# Patient Record
Sex: FEMALE | URBAN - METROPOLITAN AREA
[De-identification: names, ages, dates, MRNs, and addresses within clinical notes are randomized per-mention and may not be internally consistent; named-entity substitution may affect disease eponyms.]

---
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
## 2020-06-12 ENCOUNTER — OFFICE VISIT (OUTPATIENT)
Dept: NEUROLOGY | Facility: CLINIC | Age: 20
End: 2020-06-12
Payer: COMMERCIAL

## 2020-06-12 VITALS
OXYGEN SATURATION: 98 % | SYSTOLIC BLOOD PRESSURE: 104 MMHG | HEIGHT: 65 IN | TEMPERATURE: 97.4 F | WEIGHT: 141 LBS | HEART RATE: 77 BPM | DIASTOLIC BLOOD PRESSURE: 74 MMHG | BODY MASS INDEX: 23.49 KG/M2

## 2020-06-12 DIAGNOSIS — G43.711 INTRACTABLE CHRONIC MIGRAINE WITHOUT AURA AND WITH STATUS MIGRAINOSUS: Primary | ICD-10-CM

## 2020-06-12 PROCEDURE — 99205 OFFICE O/P NEW HI 60 MIN: CPT | Performed by: PSYCHIATRY & NEUROLOGY

## 2020-06-12 RX ORDER — RIZATRIPTAN BENZOATE 10 MG/1
TABLET, ORALLY DISINTEGRATING ORAL
Qty: 9 TABLET | Refills: 3 | Status: SHIPPED | OUTPATIENT
Start: 2020-06-12 | End: 2020-10-23

## 2020-06-12 RX ORDER — PREDNISONE 10 MG/1
TABLET ORAL
Qty: 30 TABLET | Refills: 0 | Status: SHIPPED | OUTPATIENT
Start: 2020-06-12 | End: 2020-10-23

## 2020-06-12 RX ORDER — FLUTICASONE PROPIONATE 50 MCG
SPRAY, SUSPENSION (ML) NASAL
COMMUNITY
Start: 2020-05-21 | End: 2022-07-20

## 2020-06-12 SDOH — HEALTH STABILITY: MENTAL HEALTH: HOW OFTEN DO YOU HAVE A DRINK CONTAINING ALCOHOL?: MONTHLY OR LESS

## 2020-06-12 NOTE — LETTER
"2020     Ileana Mills, DO  [address]  [address]  [place] [address]    Patient: Sirena Shin  YOB: 2000  Date of Visit: 2020      Dear Dr. Mills:    Thank you for referring Sirena Shin to me for evaluation. Below are my notes for this consultation.    If you have questions, please do not hesitate to call me. I look forward to following your patient along with you.         Sincerely,        Deborah Nogueira MD        CC: No Recipients  Deborah Nogueira MD  2020  4:49 PM  Signed  Patient ID: Sirena Shin                              : 2000  MRN: [id number]                                            VISIT DATE: 2020   ENCOUNTER PROVIDER: Deborah Nogueira  REFERRING PROVIDER: No ref. provider found    I had the pleasure of evaluating Sirena Shin in the [facility] (\[facility]) on 2020 for a new patient visit. The history was provided by Sirena Shin and her mother and supplemented by her medical records.    CHIEF COMPLAINT: Headache.    HISTORY OF PRESENT ILLNESS:  Sirena FREDERICK is here for chronic headaches that started in May 2019. The headaches started without known precipitating event (i.e. illness, new medications, head/neck injury, or significant stressor) but she was months later tested for Lyme disease and she recall having found a tick on her scalp in May 2019. Sirena was not a \"headachy\" kid or adolescent, but she reports having had 2 typical migraine headaches in high school that were more intense than her current headaches. Mother, Father, paternal grandmother, and sister have a history of severe headaches and some have been diagnosed with migraine headaches.    Her current headaches started gradually sometime in May 2019. She denies a sudden onset or daily constant pattern from the beginning. She remembers having frequent, but intermittent headaches over the summer, some of them " severe enough to require rest. They gradually increased in frequency and by the Fall she was having daily and constant headache. She initially attributed her headaches to sinusitis because of the location of the pain, but she denies other symptoms of sinusitis other than the facial/head pain. She has only been headache free a few day sin the last year, mostly during courses of antibiotics, but the headache always reoccurred within a few days, sometimes even before completing the antibiotic course. She has not tried steroids. She has only taken over-the-counters which have not helped at all.      She has had an extensive evaluation including brain MRI (3/2020), sinus CT, and extensive blood work that has been unremarkable except for a positive Lyme IgM antibody with negative IgG (interpreted as false positive by Dr. Uriarte). She was diagnosed with mycoplasma infection in February and her fatigue resolved after a course of antibiotics, but the headache continued to worsen. CBC, CMP, ESR, SUBHASH, TSH/T4 and testing for Celiac disease and thyroiditis have been negative. She has seen ENT, ID, and Eye doctors and all her evaluations have been negative.      Details about the headache:  Frequency: Daily, constant 4/10 with exacerbations. Only a few headache free days after antibiotics and after chiropractor treatment for 1 day   Location: around eyes and nose, right side much worse than left. Throbbing in the right hemicranium. Pain radiates to the right side of the neck  Quality: pressure in her face and throbbing in the right side of the head  Severity: 4-8/10, sometimes incapacitating, but not frequently (maybe about 10 times in a year incapacitated for a couple of days)  Duration: constant, always present to some degree.  Timing: worse around dinner time, present upon awakening  Aggravation by regular physical activity: yes  Associated features: denies photophobia and phonophobia (but seeks rest in dark quiet room to  sleep), denies nausea and vomiting. Some times experience vertigo when headache is more severe. Brain fog and difficulty focusing associated with the headaches.  Presence of aura: denies visual changes.   Focal neurologic symptoms: no weakness, numbness, coordination or balance problems.  No unilateral cranial autonomic symptoms (lacrimation, conjunctival injection, nasal congestion or rhinorrhea, ptosis, eyelid edema, forehead/facial sweating, miosis) restlessness or agitation.   Positional headache: no   Precipitated by Valsalva maneuvers: no  Neck pain: yes, right sided with headache  Relieving factors: rest in dark quiet room  Exacerbating factors: as below  Related to menstrual cycle: worse around menstruation  Other triggers: alcohol (even small amount and randomly), lack of sleep, fatigue, exercise, possibly stress.  Disability: Unable to perform usual activities (work/school/family/social) completely or partially when headache is severe.    HIT-6 score = 66  PHQ-9 score = 3    Lifestyle:  Sleep: good sleeper. Gets 8 hours per night. Sirean Shin denies snoring, but may grind her teeth.     Diet: does not skip meals.   Exercise: yes    PAST TREATMENTS/MEDICATIONS:  Preventive:  None other than antibiotics  Acute or as needed:  Advil no benefit  Tylenol no benefit  Excedrin migraine - no benefit  IV medications/Hospitalizations:  None  Non-Pharmacologic:  Chiropractor treatment helped.    MEDICATIONS AT START OF VISIT:    Current Outpatient Medications:   •  fluticasone propionate (FLONASE) 50 mcg/actuation nasal spray, INSTILL 2 SPRAYS TO EACH NOSTRIL ONCE DAILY, Disp: , Rfl:     ALLERGIES: has No Known Allergies.     REVIEW OF SYSTEMS: As discussed above. Otherwise, all other ROS were reviewed and negative.    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   Per mom she has unusual medical problems.    PAST SURGICAL HISTORY:  has a past surgical history  that includes Anterior cruciate ligament repair (Right, 2016) and [place] tooth extraction (Bilateral, 2015).    FAMILY HISTORY: family history includes Anti-cardiolipin syndrome in her maternal grandfather; Autoimmune disease in her biological mother; Heart disease in her paternal grandfather.   Mother had thyroiditis and mesangial proliferative glomerulonephritis in remission for 8 years (but no Lupus).  Strong family history of headaches and migraine.    SOCIAL HISTORY:  reports that she has never smoked. She has never used smokeless tobacco. She reports that she drinks alcohol. She reports that she does not use drugs.    Sirena FREDERICK has a female partner. I have discussed with her the possible teratogenic effects of some medications and she verbalized understanding.     PHYSICAL EXAMINATION:    Vitals:    06/12/20 0949   BP: 104/74   Pulse: 77   Temp: 36.3 °C (97.4 °F)   SpO2: 98%      General: Well developed, well nourished, in no acute distress.  Examination of carotid arteries: No bruits  Cardiac auscultation: Regular rate & rhythm, no murmurs.  Craniofacial examination: Normal, no evidence of temporomandibular joint disease, no trigger points. Normal conjunctiva.   Neck: Normal range of motion. No trigger points.     NEUROLOGICAL EXAM:  Alert and oriented. Normal memory, attention span and concentration. Normal language and speech. Normal fund of knowledge.   Cranial nerves:   Ophthalmoscopic examination normal with sharp disc margins bilaterally.   II-VI: Pupils were equal, round, and reactive to light and accommodation. Extraocular movements were intact without nystagmus.  V: Intact sensation to light touch in the distribution of the three trigeminal branches.   VII: Face was symmetric with normal strength.   VIII: Air conduction intact bilaterally.  IX, X: Uvula midline, palate contracts and elevates symmetrically, normal voice.  XI: Normal shoulder elevation and head turning.  XII: Tongue protrudes midline,  normal bulk and without fasciculations.   Muscle strength in upper and lower extremities: 5/5 throughout symmetrically. Pronator drift was negative.  Muscle tone in upper and lower extremities was normal.   There were no abnormal movements.  Deep tendon reflexes in upper and lower extremities: 2+ symmetrically throughout.   Coordination: intact bilaterally to finger-nose testing.  Sensation: intact to light touch in four extremities.  Gait: narrow based, could do tandem walk, and Romberg was negative.       Data Reviewed:   I will personally review the brain MRI once I am able to upload it in our system (patient brought CD today).  She has had an extensive evaluation including brain MRI (3/2020), sinus CT, and extensive blood work that has been unremarkable except for a positive Lyme IgM antibody with negative IgG (interpreted as false positive by Dr. Uriarte). She was diagnosed with mycoplasma infection in February and her fatigue resolved after a course of antibiotics, but the headache continued to worsen. CBC, CMP, ESR, SUBHASH, TSH/T4 and testing for Celiac disease and thyroiditis have been negative. She has seen ENT, ID, and Eye doctors and all her evaluations have been negative.     IMPRESSION/PLAN:  Sirena Shin is a very pleasant 19 y.o. young woman seen for chronic moderate and severe headaches since May 2019. Her neurological exam is normal. She had a normal brain MRI (3/2020). She has a strong family history of migraine. Extensive diagnostic evaluation has been unremarkable. In my opinion, she has chronic migraine. Below are my recommendations.     Diagnosis:  Chronic migraine without aura     Evaluation:  No additional tests are needed at this time. If there are new symptoms or changes in the characteristics of the headaches, I will re-evaluate Sirena FREDERICK and consider if diagnostic tests are needed.     Treatment plan:      1. Healthy Habits: The following recommendations can greatly reduce the number and  severity of headaches.  · Maintain regular sleep hours and get sufficient sleep (8-9 hours)  · Do not skip meals, especially breakfast  · Drink at least 64 oz or 8 cups of water daily - enough to urinate 5-6 times a day  · Get at least 30 minutes of daily aerobic exercise (enough to increase your heart rate and sweat)  · Keep track of headaches and used of acute medication (prescription or over-the-counter) in a calendar or notebook and bring that to your clinic visits.    2. Acute Treatment (limited to 2 days per week, in general):     Start rizatriptan 10 mg 1 tab as needed at onset of moderate to severe headache. May repeat 1 tablet 2 hours later. Maximum 2 tablets in 24 hr. Limit to 2 days/week. Recommend to take only half a tablet if it is the first time you try this medication. After that, if there are no side effects, a full tablet can be used.    Other triptans, ubrogepant, rimegepant, or migranal NS can be tried if needed in the future.   We may add an NSAID and/or an antinausea medication as co-adjuvant if monotherapy is not sufficient.      3. Preventive Treatment (when headaches occur more than 1 day/week or interfere with functioning):     Start prednisone 10 m tablets once a day x 2 days, then 4 tablets once a day x 2 days, then 3 tablets once a day x 2 days, then 2 tablets once a day x 2 days, then 1 tablet once a day x 2 days, then stop. Take in the morning with food or milk. Do not take NSAIDs while on prednisone.    Options for oral preventive medications include: nortriptyline, betablockers, topiramate, zonisamide, valproate, verapamil, magnesium, riboflavin, melatonin, gabapentin, pregabaline, duloxetine, candesartan, namenda, escitalopram, venlafaxine, and levetiracetam. I would favor nortriptyline as first line and nutraceuticals.     Procedures that can be used to prevent headaches include: botox (if headaches occur 15 or more days/month), nerve blocks and trigger point injections. We  recommend to obtain prior authorization from insurance when considering these. Most insurances will require trying 2-3 of the oral medications before they will cover botox.    Anti CGRP monoclonal antibodies (Erenumab or Aimovig, Fremanezumab or Ajovy, Galcanezumab or Emgality, and Eptinezumab or Vyepti) are a new group of treatment available for prevention of migraine. These are only approved for adult use in migraine and would be considered off-label for other headache disorders or in individuals under 18. The long term effects of these treatments remain unknown. Most insurances will require trying 2-3 of the oral medications before they will cover these the anti-CGRP antibodies.     Several non-invasive neuromodulation devices are now available to treat headaches preventively and/or acutely, but they are typically not covered by insurance. At this time, we can consider GammaCore, Cefaly, sTMS Mini, and Nerivio.     Follow-up in the Headache Clinic in 6 weeks and we will communicate through the portal in the interim.     We appreciate the opportunity to participate in the care of Sirena FREDERICK.     Please, do not hesitate to call me at [phone] if you have any questions or concerns.          Deborah Wright MD  [facility]  [facility]     I have spent more than 60 min face-to-face with the patient and more than 50% of the time was dedicated to counseling and coordination of care as described in the above note and printed after visit patient instructions.

## 2020-06-12 NOTE — PROGRESS NOTES
"Patient ID: Sirena Shin                              : 2000  MRN: [id number]                                            VISIT DATE: 2020   ENCOUNTER PROVIDER: Deborah Nogueira  REFERRING PROVIDER: No ref. provider found    I had the pleasure of evaluating Sirena Shin in the Headache Program of Neurology ([facility]) on 2020 for a new patient visit. The history was provided by Sirena Shin and her mother and supplemented by her medical records.    CHIEF COMPLAINT: Headache.    HISTORY OF PRESENT ILLNESS:  Sirena FREDERICK is here for chronic headaches that started in May 2019. The headaches started without known precipitating event (i.e. illness, new medications, head/neck injury, or significant stressor) but she was months later tested for Lyme disease and she recall having found a tick on her scalp in May 2019. Sirena was not a \"headachy\" kid or adolescent, but she reports having had 2 typical migraine headaches in high school that were more intense than her current headaches. Mother, Father, paternal grandmother, and sister have a history of severe headaches and some have been diagnosed with migraine headaches.    Her current headaches started gradually sometime in May 2019. She denies a sudden onset or daily constant pattern from the beginning. She remembers having frequent, but intermittent headaches over the summer, some of them severe enough to require rest. They gradually increased in frequency and by the Fall she was having daily and constant headache. She initially attributed her headaches to sinusitis because of the location of the pain, but she denies other symptoms of sinusitis other than the facial/head pain. She has only been headache free a few day sin the last year, mostly during courses of antibiotics, but the headache always reoccurred within a few days, sometimes even before completing the antibiotic course. She has not tried steroids. She has only taken " over-the-counters which have not helped at all.      She has had an extensive evaluation including brain MRI (3/2020), sinus CT, and extensive blood work that has been unremarkable except for a positive Lyme IgM antibody with negative IgG (interpreted as false positive by Dr. Uriarte). She was diagnosed with mycoplasma infection in February and her fatigue resolved after a course of antibiotics, but the headache continued to worsen. CBC, CMP, ESR, SUBHASH, TSH/T4 and testing for Celiac disease and thyroiditis have been negative. She has seen ENT, ID, and Eye doctors and all her evaluations have been negative.      Details about the headache:  Frequency: Daily, constant 4/10 with exacerbations. Only a few headache free days after antibiotics and after chiropractor treatment for 1 day   Location: around eyes and nose, right side much worse than left. Throbbing in the right hemicranium. Pain radiates to the right side of the neck  Quality: pressure in her face and throbbing in the right side of the head  Severity: 4-8/10, sometimes incapacitating, but not frequently (maybe about 10 times in a year incapacitated for a couple of days)  Duration: constant, always present to some degree.  Timing: worse around dinner time, present upon awakening  Aggravation by regular physical activity: yes  Associated features: denies photophobia and phonophobia (but seeks rest in dark quiet room to sleep), denies nausea and vomiting. Some times experience vertigo when headache is more severe. Brain fog and difficulty focusing associated with the headaches.  Presence of aura: denies visual changes.   Focal neurologic symptoms: no weakness, numbness, coordination or balance problems.  No unilateral cranial autonomic symptoms (lacrimation, conjunctival injection, nasal congestion or rhinorrhea, ptosis, eyelid edema, forehead/facial sweating, miosis) restlessness or agitation.   Positional headache: no   Precipitated by Valsalva maneuvers:  no  Neck pain: yes, right sided with headache  Relieving factors: rest in dark quiet room  Exacerbating factors: as below  Related to menstrual cycle: worse around menstruation  Other triggers: alcohol (even small amount and randomly), lack of sleep, fatigue, exercise, possibly stress.  Disability: Unable to perform usual activities (work/school/family/social) completely or partially when headache is severe.    HIT-6 score = 66  PHQ-9 score = 3    Lifestyle:  Sleep: good sleeper. Gets 8 hours per night. Sirena Shin denies snoring, but may grind her teeth.     Diet: does not skip meals.   Exercise: yes    PAST TREATMENTS/MEDICATIONS:  Preventive:  None other than antibiotics  Acute or as needed:  Advil no benefit  Tylenol no benefit  Excedrin migraine - no benefit  IV medications/Hospitalizations:  None  Non-Pharmacologic:  Chiropractor treatment helped.    MEDICATIONS AT START OF VISIT:    Current Outpatient Medications:   •  fluticasone propionate (FLONASE) 50 mcg/actuation nasal spray, INSTILL 2 SPRAYS TO EACH NOSTRIL ONCE DAILY, Disp: , Rfl:     ALLERGIES: has No Known Allergies.     REVIEW OF SYSTEMS: As discussed above. Otherwise, all other ROS were reviewed and negative.    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   Per mom she has unusual medical problems.    PAST SURGICAL HISTORY:  has a past surgical history that includes Anterior cruciate ligament repair (Right, 2016) and Nenzel tooth extraction (Bilateral, 2015).    FAMILY HISTORY: family history includes Anti-cardiolipin syndrome in her maternal grandfather; Autoimmune disease in her biological mother; Heart disease in her paternal grandfather.   Mother had thyroiditis and mesangial proliferative glomerulonephritis in remission for 8 years (but no Lupus).  Strong family history of headaches and migraine.    SOCIAL HISTORY:  reports that she has never smoked. She has never used smokeless  tobacco. She reports that she drinks alcohol. She reports that she does not use drugs.    Sirena FREDERICK has a female partner. I have discussed with her the possible teratogenic effects of some medications and she verbalized understanding.     PHYSICAL EXAMINATION:    Vitals:    06/12/20 0949   BP: 104/74   Pulse: 77   Temp: 36.3 °C (97.4 °F)   SpO2: 98%      General: Well developed, well nourished, in no acute distress.  Examination of carotid arteries: No bruits  Cardiac auscultation: Regular rate & rhythm, no murmurs.  Craniofacial examination: Normal, no evidence of temporomandibular joint disease, no trigger points. Normal conjunctiva.   Neck: Normal range of motion. No trigger points.     NEUROLOGICAL EXAM:  Alert and oriented. Normal memory, attention span and concentration. Normal language and speech. Normal fund of knowledge.   Cranial nerves:   Ophthalmoscopic examination normal with sharp disc margins bilaterally.   II-VI: Pupils were equal, round, and reactive to light and accommodation. Extraocular movements were intact without nystagmus.  V: Intact sensation to light touch in the distribution of the three trigeminal branches.   VII: Face was symmetric with normal strength.   VIII: Air conduction intact bilaterally.  IX, X: Uvula midline, palate contracts and elevates symmetrically, normal voice.  XI: Normal shoulder elevation and head turning.  XII: Tongue protrudes midline, normal bulk and without fasciculations.   Muscle strength in upper and lower extremities: 5/5 throughout symmetrically. Pronator drift was negative.  Muscle tone in upper and lower extremities was normal.   There were no abnormal movements.  Deep tendon reflexes in upper and lower extremities: 2+ symmetrically throughout.   Coordination: intact bilaterally to finger-nose testing.  Sensation: intact to light touch in four extremities.  Gait: narrow based, could do tandem walk, and Romberg was negative.       Data Reviewed:   I will  personally review the brain MRI once I am able to upload it in our system (patient brought CD today).  She has had an extensive evaluation including brain MRI (3/2020), sinus CT, and extensive blood work that has been unremarkable except for a positive Lyme IgM antibody with negative IgG (interpreted as false positive by Dr. Uriarte). She was diagnosed with mycoplasma infection in February and her fatigue resolved after a course of antibiotics, but the headache continued to worsen. CBC, CMP, ESR, SUBHASH, TSH/T4 and testing for Celiac disease and thyroiditis have been negative. She has seen ENT, ID, and Eye doctors and all her evaluations have been negative.     IMPRESSION/PLAN:  Sirena Shin is a very pleasant 19 y.o. young woman seen for chronic moderate and severe headaches since May 2019. Her neurological exam is normal. She had a normal brain MRI (3/2020). She has a strong family history of migraine. Extensive diagnostic evaluation has been unremarkable. In my opinion, she has chronic migraine. Below are my recommendations.     Diagnosis:  Chronic migraine without aura     Evaluation:  No additional tests are needed at this time. If there are new symptoms or changes in the characteristics of the headaches, I will re-evaluate Sirena FREDERICK and consider if diagnostic tests are needed.     Treatment plan:      1. Healthy Habits: The following recommendations can greatly reduce the number and severity of headaches.  · Maintain regular sleep hours and get sufficient sleep (8-9 hours)  · Do not skip meals, especially breakfast  · Drink at least 64 oz or 8 cups of water daily - enough to urinate 5-6 times a day  · Get at least 30 minutes of daily aerobic exercise (enough to increase your heart rate and sweat)  · Keep track of headaches and used of acute medication (prescription or over-the-counter) in a calendar or notebook and bring that to your clinic visits.    2. Acute Treatment (limited to 2 days per week, in  general):     Start rizatriptan 10 mg 1 tab as needed at onset of moderate to severe headache. May repeat 1 tablet 2 hours later. Maximum 2 tablets in 24 hr. Limit to 2 days/week. Recommend to take only half a tablet if it is the first time you try this medication. After that, if there are no side effects, a full tablet can be used.    Other triptans, ubrogepant, rimegepant, or migranal NS can be tried if needed in the future.   We may add an NSAID and/or an antinausea medication as co-adjuvant if monotherapy is not sufficient.      3. Preventive Treatment (when headaches occur more than 1 day/week or interfere with functioning):     Start prednisone 10 m tablets once a day x 2 days, then 4 tablets once a day x 2 days, then 3 tablets once a day x 2 days, then 2 tablets once a day x 2 days, then 1 tablet once a day x 2 days, then stop. Take in the morning with food or milk. Do not take NSAIDs while on prednisone.    Options for oral preventive medications include: nortriptyline, betablockers, topiramate, zonisamide, valproate, verapamil, magnesium, riboflavin, melatonin, gabapentin, pregabaline, duloxetine, candesartan, namenda, escitalopram, venlafaxine, and levetiracetam. I would favor nortriptyline as first line and nutraceuticals.     Procedures that can be used to prevent headaches include: botox (if headaches occur 15 or more days/month), nerve blocks and trigger point injections. We recommend to obtain prior authorization from insurance when considering these. Most insurances will require trying 2-3 of the oral medications before they will cover botox.    Anti CGRP monoclonal antibodies (Erenumab or Aimovig, Fremanezumab or Ajovy, Galcanezumab or Emgality, and Eptinezumab or Vyepti) are a new group of treatment available for prevention of migraine. These are only approved for adult use in migraine and would be considered off-label for other headache disorders or in individuals under 18. The long term  effects of these treatments remain unknown. Most insurances will require trying 2-3 of the oral medications before they will cover these the anti-CGRP antibodies.     Several non-invasive neuromodulation devices are now available to treat headaches preventively and/or acutely, but they are typically not covered by insurance. At this time, we can consider GammaCore, Cefaly, sTMS Mini, and Nerivio.     Follow-up in the Headache Clinic in 6 weeks and we will communicate through the portal in the interim.     We appreciate the opportunity to participate in the care of Sirena FREDERICK.     Please, do not hesitate to call me at [phone] if you have any questions or concerns.          Deborah Wright MD  [facility]  [facility]     I have spent more than 60 min face-to-face with the patient and more than 50% of the time was dedicated to counseling and coordination of care as described in the above note and printed after visit patient instructions.

## 2020-06-12 NOTE — PATIENT INSTRUCTIONS
Diagnosis:  Chronic migraine without aura     Evaluation:  No additional tests are needed at this time. If there are new symptoms or changes in the characteristics of the headaches, I will re-evaluate Sirena FREDERICK and consider if diagnostic tests are needed.     Treatment plan:      1. Healthy Habits: The following recommendations can greatly reduce the number and severity of headaches.  · Maintain regular sleep hours and get sufficient sleep (8-9 hours)  · Do not skip meals, especially breakfast  · Drink at least 64 oz or 8 cups of water daily - enough to urinate 5-6 times a day  · Get at least 30 minutes of daily aerobic exercise (enough to increase your heart rate and sweat)  · Keep track of headaches and used of acute medication (prescription or over-the-counter) in a calendar or notebook and bring that to your clinic visits.    2. Acute Treatment (limited to 2 days per week, in general):     Start rizatriptan 10 mg 1 tab as needed at onset of moderate to severe headache. May repeat 1 tablet 2 hours later. Maximum 2 tablets in 24 hr. Limit to 2 days/week. Recommend to take only half a tablet if it is the first time you try this medication. After that, if there are no side effects, a full tablet can be used.    Other triptans, ubrogepant, rimegepant, or migranal NS can be tried if needed in the future.   We may add an NSAID and/or an antinausea medication as co-adjuvant if monotherapy is not sufficient.      3. Preventive Treatment (when headaches occur more than 1 day/week or interfere with functioning):     Start prednisone Take Prednisone 10 m tablets once a day x 2 days, then 4 tablets once a day x 2 days, then 3 tablets once a day x 2 days, then 2 tablets once a day x 2 days, then 1 tablet once a day x 2 days, then stop. Take in the morning with food or milk. Do not take NSAIDs while on prednisone.    Options for oral preventive medications include: nortriptyline, betablockers, topiramate, zonisamide,  valproate, verapamil, magnesium, riboflavin, melatonin, gabapentin, pregabaline, duloxetine, candesartan, namenda, escitalopram, venlafaxine, and levetiracetam. I would favor nortriptyline as first line and nutraceuticals.     Procedures that can be used to prevent headaches include: botox (if headaches occur 15 or more days/month), nerve blocks and trigger point injections. We recommend to obtain prior authorization from insurance when considering these. Most insurances will require trying 2-3 of the oral medications before they will cover botox.    Anti CGRP monoclonal antibodies (Erenumab or Aimovig, Fremanezumab or Ajovy, Galcanezumab or Emgality, and Eptinezumab or Vyepti) are a new group of treatment available for prevention of migraine. These are only approved for adult use in migraine and would be considered off-label for other headache disorders or in individuals under 18. The long term effects of these treatments remain unknown. Most insurances will require trying 2-3 of the oral medications before they will cover these the anti-CGRP antibodies.     Several non-invasive neuromodulation devices are now available to treat headaches preventively and/or acutely, but they are typically not covered by insurance. At this time, we can consider GammaCore, Cefaly, sTMS Mini, and Nerivio.     Follow-up in the Headache Clinic in 6 weeks and we will communicate through the portal in the interim.      Please schedule your next office visit before leaving today.     Please sign up for RF nanohart at the check out desk if you don't have access already. This is our preferred method of communication. If you do not have internet access, you can call the Headache Program at [phone] if there are problems before your next visit. Experienced and highly trained medical assistants and nurses handle most of my outpatient calls during office hours. If you experience a medical emergency and cannot wait for a phone call during  office hours, please call 911 or go to the nearest emergency room or urgent care center.     It has been a pleasure seeing you today in clinic and look forward to your next visit.

## 2020-08-12 NOTE — PROGRESS NOTES
Patient ID: Sirena Shin                              : 2000  MRN: [id number]                                            VISIT DATE: 2020   ENCOUNTER PROVIDER: Deborah Nogueira  REFERRING PROVIDER: No ref. provider found    I had the pleasure of evaluating Sirena Shin in the Headache Program of [facility] ([facility]) on 2020 for a follow-up visit. The history was provided by Sirena Shin and supplemented by her medical records.    CHIEF COMPLAINT: Headache.    HISTORY OF PRESENT ILLNESS:  Sirena Shin is a very pleasant 19 y.o. young woman seen for chronic moderate and severe headaches since May 2019. She is concerned about chronic Lyme disease as she found a tick on her scalp in May 2019 when her symptoms started. However, the headaches did not have a sudden onset and they gradually worsened over the summer. She has only been headache free a few day sin the last year, mostly during courses of antibiotics, but the headache always reoccurred within a few days, sometimes even before completing the antibiotic course. She has a strong family history of migraine. Her neurological exam is normal. She had a normal brain MRI (3/2020). Extensive diagnostic evaluation has been unremarkable (ENT x 2, Ophthalmology, Dentist, and Infectious Disease). In my opinion, she has chronic migraine. She has not tried any headache preventive medications. A prednisone course did not help. Acutely, over-the-counters don't help and rizatriptan did not work, but she has only had a chance to try this once.    Follow-up Clinic Note:  Last clinic visit: 2020  Overall, Sirena FREDERICK has been getting worse. She describes her headaches being 25% of her symptoms. She also has significant fatigue, nausea and right sided neck pain.  Changes since last visit: prednisone course did not help  Headache frequency: daily with moderate headaches 2 days/weeks. Only 1 severe headache since mid .  Headache  "characteristics: Unchanged. Throbbing. Right periorbital and frontotemporal and right side of neck and hemicranium.  Preventive therapy: none. Started chiropractor treatment without any changes.  Acute therapy: tried rizatriptan once without any benefit. No SE.  Other medical problems: Denies new medical problems.    She has had another ENT evaluation that was normal, dental evaluation was normal, and would like to get a second ID opinion.    PMH/SH/FH: Reviewed and unchanged since previous visit or updated below.    Summary from previous visits.  Initial clinic visit (6/12/2020):  Sirena FREDERICK is here for chronic headaches that started in May 2019. The headaches started without known precipitating event (i.e. illness, new medications, head/neck injury, or significant stressor) but she was months later tested for Lyme disease and she recall having found a tick on her scalp in May 2019. Sirena was not a \"headachy\" kid or adolescent, but she reports having had 2 typical migraine headaches in high school that were more intense than her current headaches. Mother, Father, paternal grandmother, and sister have a history of severe headaches and some have been diagnosed with migraine headaches.    Her current headaches started gradually sometime in May 2019. She denies a sudden onset or daily constant pattern from the beginning. She remembers having frequent, but intermittent headaches over the summer, some of them severe enough to require rest. They gradually increased in frequency and by the Fall she was having daily and constant headache. She initially attributed her headaches to sinusitis because of the location of the pain, but she denies other symptoms of sinusitis other than the facial/head pain. She has only been headache free a few day sin the last year, mostly during courses of antibiotics, but the headache always reoccurred within a few days, sometimes even before completing the antibiotic course. She has not tried " steroids. She has only taken over-the-counters which have not helped at all.      She has had an extensive evaluation including brain MRI (3/2020), sinus CT, and extensive blood work that has been unremarkable except for a positive Lyme IgM antibody with negative IgG (interpreted as false positive by Dr. Uriarte). She was diagnosed with mycoplasma infection in February and her fatigue resolved after a course of antibiotics, but the headache continued to worsen. CBC, CMP, ESR, SUBHASH, TSH/T4 and testing for Celiac disease and thyroiditis have been negative. She has seen ENT, ID, and Eye doctors and all her evaluations have been negative.      Details about the headache:  Frequency: Daily, constant 4/10 with exacerbations. Only a few headache free days after antibiotics and after chiropractor treatment for 1 day   Location: around eyes and nose, right side much worse than left. Throbbing in the right hemicranium. Pain radiates to the right side of the neck  Quality: pressure in her face and throbbing in the right side of the head  Severity: 4-8/10, sometimes incapacitating, but not frequently (maybe about 10 times in a year incapacitated for a couple of days)  Duration: constant, always present to some degree.  Timing: worse around dinner time, present upon awakening  Aggravation by regular physical activity: yes  Associated features: denies photophobia and phonophobia (but seeks rest in dark quiet room to sleep), denies nausea and vomiting. Some times experience vertigo when headache is more severe. Brain fog and difficulty focusing associated with the headaches.  Presence of aura: denies visual changes.   Focal neurologic symptoms: no weakness, numbness, coordination or balance problems.  No unilateral cranial autonomic symptoms (lacrimation, conjunctival injection, nasal congestion or rhinorrhea, ptosis, eyelid edema, forehead/facial sweating, miosis) restlessness or agitation.   Positional headache: no    Precipitated by Valsalva maneuvers: no  Neck pain: yes, right sided with headache  Relieving factors: rest in dark quiet room  Exacerbating factors: as below  Related to menstrual cycle: worse around menstruation  Other triggers: alcohol (even small amount and randomly), lack of sleep, fatigue, exercise, possibly stress.  Disability: Unable to perform usual activities (work/school/family/social) completely or partially when headache is severe.    HIT-6 score = 66  PHQ-9 score = 3    Lifestyle:  Sleep: good sleeper. Gets 8 hours per night. Sirena Shin denies snoring, but may grind her teeth.     Diet: does not skip meals.   Exercise: yes    PAST TREATMENTS/MEDICATIONS:  Preventive:  None other than antibiotics  Acute or as needed:  Advil no benefit  Tylenol no benefit  Excedrin migraine - no benefit  IV medications/Hospitalizations:  None  Non-Pharmacologic:  Chiropractor treatment helped.    MEDICATIONS AT START OF VISIT:    Current Outpatient Medications:   •  rizatriptan MLT (MAXALT-MLT) 10 mg disintegrating tablet, 1 tab at onset of severe headache. May repeat in 2 hours if needed. Maximum 2 tab/day and 2 days/week., Disp: 9 tablet, Rfl: 3  •  fluticasone propionate (FLONASE) 50 mcg/actuation nasal spray, INSTILL 2 SPRAYS TO EACH NOSTRIL ONCE DAILY, Disp: , Rfl:   •  nortriptyline (PAMELOR) 10 mg capsule, Take 10 mg every night for 1 week, then 20 mg every night for 1 week, then 30 mg every night., Disp: 90 capsule, Rfl: 3  •  predniSONE (DELTASONE) 10 mg tablet, 5 tab daily x 2 days, then 4 tab daily x 2 days, then 3 tab daily x 2 days, then 2 tab daily x 2 days, then 1 tab daily x 2 days, then stop. Take in AM with food. (Patient not taking: Reported on 8/14/2020 ), Disp: 30 tablet, Rfl: 0  •  tiZANidine (ZANAFLEX) 2 mg tablet, Take 1-2 tab as needed for neck pain and headache up to three times a day., Disp: 60 tablet, Rfl: 3    ALLERGIES: has No Known Allergies.     REVIEW OF SYSTEMS: As discussed  above. Otherwise, all other ROS were reviewed and negative.    PAST MEDICAL HISTORY:  has a past medical history of Atypical Kawasaki disease (CMS/HCC) (2002).   Per mom she has unusual medical problems.    PAST SURGICAL HISTORY:  has a past surgical history that includes Anterior cruciate ligament repair (Right, 2016) and [place] tooth extraction (Bilateral, 2015).    FAMILY HISTORY: family history includes Anti-cardiolipin syndrome in her maternal grandfather; Autoimmune disease in her biological mother; Heart disease in her paternal grandfather.   Mother had thyroiditis and mesangial proliferative glomerulonephritis in remission for 8 years (but no Lupus).  Strong family history of headaches and migraine.    SOCIAL HISTORY:  reports that she has never smoked. She has never used smokeless tobacco. She reports that she drinks alcohol. She reports that she does not use drugs.    Sirena FREDERICK has a female partner. I have discussed with her the possible teratogenic effects of some medications and she verbalized understanding.     PHYSICAL EXAMINATION:    Vitals:    08/14/20 1522   BP: 129/76   Resp: 18   Temp: 36.7 °C (98.1 °F)   SpO2: 98%      General: Well developed, well nourished, in no acute distress.  Alert and oriented. Fluent with normal language and speech. Face symmetric.     Data Reviewed:   I have personally reviewed the brain MRI (patient brought CD to first visit)  She has had an extensive evaluation including brain MRI (3/2020), sinus CT, and extensive blood work that has been unremarkable except for a positive Lyme IgM antibody with negative IgG (interpreted as false positive by Dr. Uriarte). She was diagnosed with mycoplasma infection in February and her fatigue resolved after a course of antibiotics, but the headache continued to worsen. CBC, CMP, ESR, SUBHASH, TSH/T4 and testing for Celiac disease and thyroiditis have been negative. She has seen ENT, ID, Dentist, and Eye doctors and all her evaluations have  been negative.   Brain MRI with and without gadolinium (3/2020) normal per my review of the scans and per rad report.    IMPRESSION/PLAN:  Sirena Shin is a very pleasant 19 y.o.  young woman seen for chronic moderate and severe headaches since May 2019. She is concerned about chronic Lyme disease as she found a tick on her scalp in May 2019 when her symptoms started. However, the headaches did not have a sudden onset and they gradually worsened over the summer. She has only been headache free a few day sin the last year, mostly during courses of antibiotics, but the headache always reoccurred within a few days, sometimes even before completing the antibiotic course. She has a strong family history of migraine. Her neurological exam is normal. She had a normal brain MRI (3/2020). Extensive diagnostic evaluation has been unremarkable (ENT x 2, Ophthalmology, Dentist, and Infectious Disease). In my opinion, she has chronic migraine. She has not tried any headache preventive medications. A prednisone course did not help. Acutely, over-the-counters don't help and rizatriptan did not work, but she has only had a chance to try this once. Below are my recommendations.     Diagnosis:  Chronic migraine without aura     Evaluation:  No additional tests are needed at this time. If there are new symptoms or changes in the characteristics of the headaches, I will re-evaluate Sirena FREDERICK and consider if diagnostic tests are needed.     Treatment plan:     For future consideration - physical therapy     1. Healthy Habits: The following recommendations can greatly reduce the number and severity of headaches.  · Maintain regular sleep hours and get sufficient sleep (8-9 hours)  · Do not skip meals, especially breakfast  · Drink at least 64 oz or 8 cups of water daily - enough to urinate 5-6 times a day  · Get at least 30 minutes of daily aerobic exercise (enough to increase your heart rate and sweat)  · Keep track of headaches and  used of acute medication (prescription or over-the-counter) in a calendar or notebook and bring that to your clinic visits.    2. Acute Treatment (limited to 2 days per week, in general):     Take tizanidine 2-4 mg every 8 hours as needed for neck pain or moderate to severe headache.    If this doesn't help, may take rizatriptan 10 mg for rescue. May repeat 1 tablet 2 hours later. Maximum 2 tablets in 24 hr. Limit to 2 days/week.     Other triptans, ubrogepant, rimegepant, or migranal NS can be tried if needed in the future.   We may add an NSAID and/or an antinausea medication as co-adjuvant if monotherapy is not sufficient.      3. Preventive Treatment (when headaches occur more than 1 day/week or interfere with functioning):     Start nortriptyline 10 mg 1 capsule at bedtime for 1 week, then 2 capsules at bedtime for 1 week, then 3 capsules at bedtime.     Other options for oral preventive medications include: amitriptyline, betablockers, topiramate, zonisamide, valproate, verapamil, magnesium, riboflavin, melatonin, gabapentin, pregabaline, duloxetine, candesartan, namenda, escitalopram, venlafaxine, and levetiracetam. I would favor nortriptyline as first line and nutraceuticals.     Procedures that can be used to prevent headaches include: botox (if headaches occur 15 or more days/month), nerve blocks and trigger point injections. We recommend to obtain prior authorization from insurance when considering these. Most insurances will require trying 2-3 of the oral medications before they will cover botox.    Anti CGRP monoclonal antibodies (Erenumab or Aimovig, Fremanezumab or Ajovy, Galcanezumab or Emgality, and Eptinezumab or Vyepti) are a new group of treatment available for prevention of migraine. These are only approved for adult use in migraine and would be considered off-label for other headache disorders or in individuals under 18. The long term effects of these treatments remain unknown. Most insurances  will require trying 2-3 of the oral medications before they will cover these the anti-CGRP antibodies.     Several non-invasive neuromodulation devices are now available to treat headaches preventively and/or acutely, but they are typically not covered by insurance. At this time, we can consider GammaCore, Cefaly, sTMS Mini, and Nerivio.     Follow-up in the Headache Clinic in 2 months.     We appreciate the opportunity to participate in the care of Sirena FREDERICK.     Please, do not hesitate to call me at [phone] if you have any questions or concerns.          Deborah Wright MD  [facility]  [facility]     I have spent more than 40 min face-to-face with the patient and more than 50% of the time was dedicated to counseling and coordination of care as described in the above note and printed after visit patient instructions.

## 2020-08-14 ENCOUNTER — OFFICE VISIT (OUTPATIENT)
Dept: NEUROLOGY | Facility: CLINIC | Age: 20
End: 2020-08-14
Payer: COMMERCIAL

## 2020-08-14 VITALS
TEMPERATURE: 98.1 F | DIASTOLIC BLOOD PRESSURE: 76 MMHG | RESPIRATION RATE: 18 BRPM | BODY MASS INDEX: 22.5 KG/M2 | SYSTOLIC BLOOD PRESSURE: 129 MMHG | WEIGHT: 140 LBS | OXYGEN SATURATION: 98 % | HEIGHT: 66 IN

## 2020-08-14 DIAGNOSIS — G43.711 INTRACTABLE CHRONIC MIGRAINE WITHOUT AURA AND WITH STATUS MIGRAINOSUS: Primary | ICD-10-CM

## 2020-08-14 PROCEDURE — 99215 OFFICE O/P EST HI 40 MIN: CPT | Performed by: PSYCHIATRY & NEUROLOGY

## 2020-08-14 RX ORDER — NORTRIPTYLINE HYDROCHLORIDE 10 MG/1
CAPSULE ORAL
Qty: 90 CAPSULE | Refills: 3 | Status: SHIPPED | OUTPATIENT
Start: 2020-08-14 | End: 2020-10-23 | Stop reason: SDUPTHER

## 2020-08-14 RX ORDER — TIZANIDINE 2 MG/1
TABLET ORAL
Qty: 60 TABLET | Refills: 3 | Status: SHIPPED | OUTPATIENT
Start: 2020-08-14 | End: 2020-10-23

## 2020-08-14 NOTE — PATIENT INSTRUCTIONS
Preventive Treatment (when headaches occur more than 1 day/week or interfere with functioning):     Start nortriptyline 10 mg 1 capsule at bedtime for 1 week, then 2 capsules at bedtime for 1 week, then 3 capsules at bedtime.     Acute Treatment (limited to 2 days per week, in general):   Take tizanidine 2-4 mg every 8 hours as needed for neck pain or moderate to severe headache.    If this doesn't help, may take rizatriptan 10 mg for rescue. May repeat 1 tablet 2 hours later. Maximum 2 tablets in 24 hr. Limit to 2 days/week.     Other triptans, ubrogepant, rimegepant, or migranal NS can be tried if needed in the future.     We may add an NSAID and/or an antinausea medication as co-adjuvant if monotherapy is not sufficient.     Follow-up in 2 months or sooner if needed.      Please schedule your next office visit before leaving today.     Please sign up for [employer] at the check out desk if you don't have access already. This is our preferred method of communication. If you do not have internet access, you can call the Headache Program at [phone] if there are problems before your next visit. Experienced and highly trained medical assistants and nurses handle most of my outpatient calls during office hours. If you experience a medical emergency and cannot wait for a phone call during office hours, please call 911 or go to the nearest emergency room or urgent care center.     It has been a pleasure seeing you today in clinic and look forward to your next visit.

## 2020-08-14 NOTE — LETTER
2020     Ileana Mills, DO  [address]  [address]  [place] [address]    Patient: Sirena Shin  YOB: 2000  Date of Visit: 2020      Dear Dr. Mills:    Thank you for referring Sirena Shin to me for evaluation. Below are my notes for this consultation.    If you have questions, please do not hesitate to call me. I look forward to following your patient along with you.         Sincerely,        Deborah Nogueira MD        CC: No Recipients  Deborah Nogueira MD  2020  4:57 PM  Signed  Patient ID: Sirena Shin                              : 2000  MRN: [id number]                                            VISIT DATE: 2020   ENCOUNTER PROVIDER: Deborah Nogueira  REFERRING PROVIDER: No ref. provider found    I had the pleasure of evaluating Sirena Shin in the [facility] of [facility] ([facility]) on 2020 for a follow-up visit. The history was provided by Sirena Shin and supplemented by her medical records.    CHIEF COMPLAINT: Headache.    HISTORY OF PRESENT ILLNESS:  Sirena Shin is a very pleasant 19 y.o. young woman seen for chronic moderate and severe headaches since May 2019. She is concerned about chronic Lyme disease as she found a tick on her scalp in May 2019 when her symptoms started. However, the headaches did not have a sudden onset and they gradually worsened over the summer. She has only been headache free a few day sin the last year, mostly during courses of antibiotics, but the headache always reoccurred within a few days, sometimes even before completing the antibiotic course. She has a strong family history of migraine. Her neurological exam is normal. She had a normal brain MRI (3/2020). Extensive diagnostic evaluation has been unremarkable (ENT x 2, Ophthalmology, Dentist, and Infectious Disease). In my opinion, she has chronic migraine. She has not tried any headache preventive  "medications. A prednisone course did not help. Acutely, over-the-counters don't help and rizatriptan did not work, but she has only had a chance to try this once.    Follow-up Clinic Note:  Last clinic visit: 6/12/2020  Overall, Sirena FREDERICK has been getting worse. She describes her headaches being 25% of her symptoms. She also has significant fatigue, nausea and right sided neck pain.  Changes since last visit: prednisone course did not help  Headache frequency: daily with moderate headaches 2 days/weeks. Only 1 severe headache since mid June.  Headache characteristics: Unchanged. Throbbing. Right periorbital and frontotemporal and right side of neck and hemicranium.  Preventive therapy: none. Started chiropractor treatment without any changes.  Acute therapy: tried rizatriptan once without any benefit. No SE.  Other medical problems: Denies new medical problems.    She has had another ENT evaluation that was normal, dental evaluation was normal, and would like to get a second ID opinion.    PMH/SH/FH: Reviewed and unchanged since previous visit or updated below.    Summary from previous visits.  Initial clinic visit (6/12/2020):  Sirena FREDERICK is here for chronic headaches that started in May 2019. The headaches started without known precipitating event (i.e. illness, new medications, head/neck injury, or significant stressor) but she was months later tested for Lyme disease and she recall having found a tick on her scalp in May 2019. Sirena was not a \"headachy\" kid or adolescent, but she reports having had 2 typical migraine headaches in high school that were more intense than her current headaches. Mother, Father, paternal grandmother, and sister have a history of severe headaches and some have been diagnosed with migraine headaches.    Her current headaches started gradually sometime in May 2019. She denies a sudden onset or daily constant pattern from the beginning. She remembers having frequent, but intermittent " headaches over the summer, some of them severe enough to require rest. They gradually increased in frequency and by the Fall she was having daily and constant headache. She initially attributed her headaches to sinusitis because of the location of the pain, but she denies other symptoms of sinusitis other than the facial/head pain. She has only been headache free a few day sin the last year, mostly during courses of antibiotics, but the headache always reoccurred within a few days, sometimes even before completing the antibiotic course. She has not tried steroids. She has only taken over-the-counters which have not helped at all.      She has had an extensive evaluation including brain MRI (3/2020), sinus CT, and extensive blood work that has been unremarkable except for a positive Lyme IgM antibody with negative IgG (interpreted as false positive by Dr. Uriarte). She was diagnosed with mycoplasma infection in February and her fatigue resolved after a course of antibiotics, but the headache continued to worsen. CBC, CMP, ESR, SUBHASH, TSH/T4 and testing for Celiac disease and thyroiditis have been negative. She has seen ENT, ID, and Eye doctors and all her evaluations have been negative.      Details about the headache:  Frequency: Daily, constant 4/10 with exacerbations. Only a few headache free days after antibiotics and after chiropractor treatment for 1 day   Location: around eyes and nose, right side much worse than left. Throbbing in the right hemicranium. Pain radiates to the right side of the neck  Quality: pressure in her face and throbbing in the right side of the head  Severity: 4-8/10, sometimes incapacitating, but not frequently (maybe about 10 times in a year incapacitated for a couple of days)  Duration: constant, always present to some degree.  Timing: worse around dinner time, present upon awakening  Aggravation by regular physical activity: yes  Associated features: denies photophobia and phonophobia  (but seeks rest in dark quiet room to sleep), denies nausea and vomiting. Some times experience vertigo when headache is more severe. Brain fog and difficulty focusing associated with the headaches.  Presence of aura: denies visual changes.   Focal neurologic symptoms: no weakness, numbness, coordination or balance problems.  No unilateral cranial autonomic symptoms (lacrimation, conjunctival injection, nasal congestion or rhinorrhea, ptosis, eyelid edema, forehead/facial sweating, miosis) restlessness or agitation.   Positional headache: no   Precipitated by Valsalva maneuvers: no  Neck pain: yes, right sided with headache  Relieving factors: rest in dark quiet room  Exacerbating factors: as below  Related to menstrual cycle: worse around menstruation  Other triggers: alcohol (even small amount and randomly), lack of sleep, fatigue, exercise, possibly stress.  Disability: Unable to perform usual activities (work/school/family/social) completely or partially when headache is severe.    HIT-6 score = 66  PHQ-9 score = 3    Lifestyle:  Sleep: good sleeper. Gets 8 hours per night. Sirena Shin denies snoring, but may grind her teeth.     Diet: does not skip meals.   Exercise: yes    PAST TREATMENTS/MEDICATIONS:  Preventive:  None other than antibiotics  Acute or as needed:  Advil no benefit  Tylenol no benefit  Excedrin migraine - no benefit  IV medications/Hospitalizations:  None  Non-Pharmacologic:  Chiropractor treatment helped.    MEDICATIONS AT START OF VISIT:    Current Outpatient Medications:   •  rizatriptan MLT (MAXALT-MLT) 10 mg disintegrating tablet, 1 tab at onset of severe headache. May repeat in 2 hours if needed. Maximum 2 tab/day and 2 days/week., Disp: 9 tablet, Rfl: 3  •  fluticasone propionate (FLONASE) 50 mcg/actuation nasal spray, INSTILL 2 SPRAYS TO EACH NOSTRIL ONCE DAILY, Disp: , Rfl:   •  nortriptyline (PAMELOR) 10 mg capsule, Take 10 mg every night for 1 week, then 20 mg every night for 1  week, then 30 mg every night., Disp: 90 capsule, Rfl: 3  •  predniSONE (DELTASONE) 10 mg tablet, 5 tab daily x 2 days, then 4 tab daily x 2 days, then 3 tab daily x 2 days, then 2 tab daily x 2 days, then 1 tab daily x 2 days, then stop. Take in AM with food. (Patient not taking: Reported on 8/14/2020 ), Disp: 30 tablet, Rfl: 0  •  tiZANidine (ZANAFLEX) 2 mg tablet, Take 1-2 tab as needed for neck pain and headache up to three times a day., Disp: 60 tablet, Rfl: 3    ALLERGIES: has No Known Allergies.     REVIEW OF SYSTEMS: As discussed above. Otherwise, all other ROS were reviewed and negative.    PAST MEDICAL HISTORY:  has a past medical history of Atypical Kawasaki disease (CMS/HCC) (2002).   Per mom she has unusual medical problems.    PAST SURGICAL HISTORY:  has a past surgical history that includes Anterior cruciate ligament repair (Right, 2016) and [place] tooth extraction (Bilateral, 2015).    FAMILY HISTORY: family history includes Anti-cardiolipin syndrome in her maternal grandfather; Autoimmune disease in her biological mother; Heart disease in her paternal grandfather.   Mother had thyroiditis and mesangial proliferative glomerulonephritis in remission for 8 years (but no Lupus).  Strong family history of headaches and migraine.    SOCIAL HISTORY:  reports that she has never smoked. She has never used smokeless tobacco. She reports that she drinks alcohol. She reports that she does not use drugs.    Sirena FREDERICK has a female partner. I have discussed with her the possible teratogenic effects of some medications and she verbalized understanding.     PHYSICAL EXAMINATION:    Vitals:    08/14/20 1522   BP: 129/76   Resp: 18   Temp: 36.7 °C (98.1 °F)   SpO2: 98%      General: Well developed, well nourished, in no acute distress.  Alert and oriented. Fluent with normal language and speech. Face symmetric.     Data Reviewed:   I have personally reviewed the brain MRI (patient brought CD to first visit)  She has had  an extensive evaluation including brain MRI (3/2020), sinus CT, and extensive blood work that has been unremarkable except for a positive Lyme IgM antibody with negative IgG (interpreted as false positive by Dr. Uriarte). She was diagnosed with mycoplasma infection in February and her fatigue resolved after a course of antibiotics, but the headache continued to worsen. CBC, CMP, ESR, SUBHASH, TSH/T4 and testing for Celiac disease and thyroiditis have been negative. She has seen ENT, ID, Dentist, and Eye doctors and all her evaluations have been negative.   Brain MRI with and without gadolinium (3/2020) normal per my review of the scans and per rad report.    IMPRESSION/PLAN:  Sirena Shin is a very pleasant 19 y.o.  young woman seen for chronic moderate and severe headaches since May 2019. She is concerned about chronic Lyme disease as she found a tick on her scalp in May 2019 when her symptoms started. However, the headaches did not have a sudden onset and they gradually worsened over the summer. She has only been headache free a few day sin the last year, mostly during courses of antibiotics, but the headache always reoccurred within a few days, sometimes even before completing the antibiotic course. She has a strong family history of migraine. Her neurological exam is normal. She had a normal brain MRI (3/2020). Extensive diagnostic evaluation has been unremarkable (ENT x 2, Ophthalmology, Dentist, and Infectious Disease). In my opinion, she has chronic migraine. She has not tried any headache preventive medications. A prednisone course did not help. Acutely, over-the-counters don't help and rizatriptan did not work, but she has only had a chance to try this once. Below are my recommendations.     Diagnosis:  Chronic migraine without aura     Evaluation:  No additional tests are needed at this time. If there are new symptoms or changes in the characteristics of the headaches, I will re-evaluate Sirena FREDERICK and  consider if diagnostic tests are needed.     Treatment plan:     For future consideration - physical therapy     1. Healthy Habits: The following recommendations can greatly reduce the number and severity of headaches.  · Maintain regular sleep hours and get sufficient sleep (8-9 hours)  · Do not skip meals, especially breakfast  · Drink at least 64 oz or 8 cups of water daily - enough to urinate 5-6 times a day  · Get at least 30 minutes of daily aerobic exercise (enough to increase your heart rate and sweat)  · Keep track of headaches and used of acute medication (prescription or over-the-counter) in a calendar or notebook and bring that to your clinic visits.    2. Acute Treatment (limited to 2 days per week, in general):     Take tizanidine 2-4 mg every 8 hours as needed for neck pain or moderate to severe headache.    If this doesn't help, may take rizatriptan 10 mg for rescue. May repeat 1 tablet 2 hours later. Maximum 2 tablets in 24 hr. Limit to 2 days/week.     Other triptans, ubrogepant, rimegepant, or migranal NS can be tried if needed in the future.   We may add an NSAID and/or an antinausea medication as co-adjuvant if monotherapy is not sufficient.      3. Preventive Treatment (when headaches occur more than 1 day/week or interfere with functioning):     Start nortriptyline 10 mg 1 capsule at bedtime for 1 week, then 2 capsules at bedtime for 1 week, then 3 capsules at bedtime.     Other options for oral preventive medications include: amitriptyline, betablockers, topiramate, zonisamide, valproate, verapamil, magnesium, riboflavin, melatonin, gabapentin, pregabaline, duloxetine, candesartan, namenda, escitalopram, venlafaxine, and levetiracetam. I would favor nortriptyline as first line and nutraceuticals.     Procedures that can be used to prevent headaches include: botox (if headaches occur 15 or more days/month), nerve blocks and trigger point injections. We recommend to obtain prior authorization  from insurance when considering these. Most insurances will require trying 2-3 of the oral medications before they will cover botox.    Anti CGRP monoclonal antibodies (Erenumab or Aimovig, Fremanezumab or Ajovy, Galcanezumab or Emgality, and Eptinezumab or Vyepti) are a new group of treatment available for prevention of migraine. These are only approved for adult use in migraine and would be considered off-label for other headache disorders or in individuals under 18. The long term effects of these treatments remain unknown. Most insurances will require trying 2-3 of the oral medications before they will cover these the anti-CGRP antibodies.     Several non-invasive neuromodulation devices are now available to treat headaches preventively and/or acutely, but they are typically not covered by insurance. At this time, we can consider GammaCore, Cefaly, sTMS Mini, and Nerivio.     Follow-up in the Headache Clinic in 2 months.     We appreciate the opportunity to participate in the care of Sirena FREDERICK.     Please, do not hesitate to call me at [phone] if you have any questions or concerns.          Deborah Wright MD  [facility]  [facility]     I have spent more than 40 min face-to-face with the patient and more than 50% of the time was dedicated to counseling and coordination of care as described in the above note and printed after visit patient instructions.

## 2020-10-23 ENCOUNTER — OFFICE VISIT (OUTPATIENT)
Dept: NEUROLOGY | Facility: CLINIC | Age: 20
End: 2020-10-23
Payer: COMMERCIAL

## 2020-10-23 VITALS
RESPIRATION RATE: 16 BRPM | SYSTOLIC BLOOD PRESSURE: 103 MMHG | BODY MASS INDEX: 22.5 KG/M2 | HEIGHT: 66 IN | TEMPERATURE: 98.2 F | HEART RATE: 81 BPM | WEIGHT: 140 LBS | DIASTOLIC BLOOD PRESSURE: 68 MMHG | OXYGEN SATURATION: 99 %

## 2020-10-23 DIAGNOSIS — G43.711 INTRACTABLE CHRONIC MIGRAINE WITHOUT AURA AND WITH STATUS MIGRAINOSUS: Primary | ICD-10-CM

## 2020-10-23 PROCEDURE — 99214 OFFICE O/P EST MOD 30 MIN: CPT | Performed by: PSYCHIATRY & NEUROLOGY

## 2020-10-23 RX ORDER — NORTRIPTYLINE HYDROCHLORIDE 10 MG/1
CAPSULE ORAL
Qty: 270 CAPSULE | Refills: 1 | Status: SHIPPED | OUTPATIENT
Start: 2020-10-23 | End: 2021-04-23 | Stop reason: SDUPTHER

## 2020-10-23 RX ORDER — SUMATRIPTAN SUCCINATE 100 MG/1
TABLET ORAL
Qty: 12 TABLET | Refills: 6 | Status: SHIPPED | OUTPATIENT
Start: 2020-10-23 | End: 2021-04-23

## 2020-10-23 NOTE — PROGRESS NOTES
Patient ID: Sirena Shin                              : 2000  MRN: [id number]                                            VISIT DATE: 10/23/2020   ENCOUNTER PROVIDER: Deborah Nogueira  REFERRING PROVIDER: No ref. provider found    I had the pleasure of evaluating Sirena Shin in the Headache Program of Neurology ([facility]) on 10/23/2020 for a follow-up visit. The history was provided by Sirena hSin and supplemented by her medical records.    CHIEF COMPLAINT: Headache.    HISTORY OF PRESENT ILLNESS:  Sirena Shin is a very pleasant 20 y.o. young woman seen for chronic moderate and severe headaches since May 2019. She is concerned about chronic Lyme disease as she found a tick on her scalp in May 2019 when her symptoms started. However, the headaches did not have a sudden onset and they gradually worsened over the summer. She has only been headache free a few day sin the last year, mostly during courses of antibiotics, but the headache always reoccurred within a few days, sometimes even before completing the antibiotic course. She has a strong family history of migraine. Her neurological exam is normal. She had a normal brain MRI (3/2020). Extensive diagnostic evaluation has been unremarkable (ENT x 2, Ophthalmology, Dentist, and Infectious Disease). In my opinion, she has chronic migraine. She has not tried any headache preventive medications. A prednisone course did not help. Acutely, over-the-counters and rizatriptan have not worked.    Follow-up Clinic Note:  Last clinic visit: 2020  Overall, Sirena FREDERICK has been getting better since her last visit. Headaches are better but right sided neck pain continues to be present and unchanged.  Changes since last visit: started nortriptyline.   Headache frequency: not daily at this time. They tend to last for several days and get better over the weekend with extra sleep and rest. She recently went for 3 weeks without  "headache.  Headache characteristics: Unchanged. Throbbing. Right periorbital and frontotemporal and right side of neck and hemicranium.  Preventive therapy: nortriptyline 30 mg QHS. Helping. Causing some dry mouth and constipation, but these are tolerable for now.   Acute therapy: rizatriptan does not help. Zanaflex did not help.   Other medical problems: Denies new medical problems.    Has seen another ID specialist and is planning to see an Orthopaedic doctor for her neck.     PMH/SH/FH: Reviewed and unchanged since previous visit or updated below.    Summary from previous visits.  Visit August 2020  Last clinic visit: 6/12/2020  Overall, Sirena FREDERICK has been getting worse. She describes her headaches being 25% of her symptoms. She also has significant fatigue, nausea and right sided neck pain.  Changes since last visit: prednisone course did not help  Headache frequency: daily with moderate headaches 2 days/weeks. Only 1 severe headache since mid June.  Headache characteristics: Unchanged. Throbbing. Right periorbital and frontotemporal and right side of neck and hemicranium.  Preventive therapy: none. Started chiropractor treatment without any changes.  Acute therapy: tried rizatriptan once without any benefit. No SE.  Other medical problems: Denies new medical problems.    She has had another ENT evaluation that was normal, dental evaluation was normal, and would like to get a second ID opinion.    Initial clinic visit (6/12/2020):  Sirena FREDERICK is here for chronic headaches that started in May 2019. The headaches started without known precipitating event (i.e. illness, new medications, head/neck injury, or significant stressor) but she was months later tested for Lyme disease and she recall having found a tick on her scalp in May 2019. Sirena was not a \"headachy\" kid or adolescent, but she reports having had 2 typical migraine headaches in high school that were more intense than her current headaches. Mother, Father, " paternal grandmother, and sister have a history of severe headaches and some have been diagnosed with migraine headaches.    Her current headaches started gradually sometime in May 2019. She denies a sudden onset or daily constant pattern from the beginning. She remembers having frequent, but intermittent headaches over the summer, some of them severe enough to require rest. They gradually increased in frequency and by the Fall she was having daily and constant headache. She initially attributed her headaches to sinusitis because of the location of the pain, but she denies other symptoms of sinusitis other than the facial/head pain. She has only been headache free a few day sin the last year, mostly during courses of antibiotics, but the headache always reoccurred within a few days, sometimes even before completing the antibiotic course. She has not tried steroids. She has only taken over-the-counters which have not helped at all.      She has had an extensive evaluation including brain MRI (3/2020), sinus CT, and extensive blood work that has been unremarkable except for a positive Lyme IgM antibody with negative IgG (interpreted as false positive by Dr. Uriarte). She was diagnosed with mycoplasma infection in February and her fatigue resolved after a course of antibiotics, but the headache continued to worsen. CBC, CMP, ESR, SUBHASH, TSH/T4 and testing for Celiac disease and thyroiditis have been negative. She has seen ENT, ID, and Eye doctors and all her evaluations have been negative.      Details about the headache:  Frequency: Daily, constant 4/10 with exacerbations. Only a few headache free days after antibiotics and after chiropractor treatment for 1 day   Location: around eyes and nose, right side much worse than left. Throbbing in the right hemicranium. Pain radiates to the right side of the neck  Quality: pressure in her face and throbbing in the right side of the head  Severity: 4-8/10, sometimes  incapacitating, but not frequently (maybe about 10 times in a year incapacitated for a couple of days)  Duration: constant, always present to some degree.  Timing: worse around dinner time, present upon awakening  Aggravation by regular physical activity: yes  Associated features: denies photophobia and phonophobia (but seeks rest in dark quiet room to sleep), denies nausea and vomiting. Some times experience vertigo when headache is more severe. Brain fog and difficulty focusing associated with the headaches.  Presence of aura: denies visual changes.   Focal neurologic symptoms: no weakness, numbness, coordination or balance problems.  No unilateral cranial autonomic symptoms (lacrimation, conjunctival injection, nasal congestion or rhinorrhea, ptosis, eyelid edema, forehead/facial sweating, miosis) restlessness or agitation.   Positional headache: no   Precipitated by Valsalva maneuvers: no  Neck pain: yes, right sided with headache  Relieving factors: rest in dark quiet room  Exacerbating factors: as below  Related to menstrual cycle: worse around menstruation  Other triggers: alcohol (even small amount and randomly), lack of sleep, fatigue, exercise, possibly stress.  Disability: Unable to perform usual activities (work/school/family/social) completely or partially when headache is severe.    HIT-6 score = 66  PHQ-9 score = 3    Lifestyle:  Sleep: good sleeper. Gets 8 hours per night. Sirena Shin denies snoring, but may grind her teeth.     Diet: does not skip meals.   Exercise: yes    PAST TREATMENTS/MEDICATIONS:  Preventive:  Nortriptyline 30 mg QHS - helping with headache prevention but causing constipation and dry mouth.  Acute or as needed:  Advil no benefit  Tylenol no benefit  Excedrin migraine - no benefit  Rizatriptan - no benefit. No SE.   Tizanidine - no benefit.  IV medications/Hospitalizations:  None  Non-Pharmacologic:  Chiropractor treatment helped.    MEDICATIONS AT START OF  VISIT:    Current Outpatient Medications:   •  nortriptyline (PAMELOR) 10 mg capsule, Take 10 mg every night for 1 week, then 20 mg every night for 1 week, then 30 mg every night., Disp: 90 capsule, Rfl: 3  •  rizatriptan MLT (MAXALT-MLT) 10 mg disintegrating tablet, 1 tab at onset of severe headache. May repeat in 2 hours if needed. Maximum 2 tab/day and 2 days/week., Disp: 9 tablet, Rfl: 3  •  tiZANidine (ZANAFLEX) 2 mg tablet, Take 1-2 tab as needed for neck pain and headache up to three times a day., Disp: 60 tablet, Rfl: 3  •  fluticasone propionate (FLONASE) 50 mcg/actuation nasal spray, INSTILL 2 SPRAYS TO EACH NOSTRIL ONCE DAILY, Disp: , Rfl:   •  predniSONE (DELTASONE) 10 mg tablet, 5 tab daily x 2 days, then 4 tab daily x 2 days, then 3 tab daily x 2 days, then 2 tab daily x 2 days, then 1 tab daily x 2 days, then stop. Take in AM with food. (Patient not taking: Reported on 8/14/2020 ), Disp: 30 tablet, Rfl: 0    ALLERGIES: has No Known Allergies.     REVIEW OF SYSTEMS: As discussed above. Otherwise, all other ROS were reviewed and negative.    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   Per mom she has unusual medical problems.    PAST SURGICAL HISTORY:  has a past surgical history that includes Anterior cruciate ligament repair (Right, 2016) and [place] tooth extraction (Bilateral, 2015).    FAMILY HISTORY: family history includes Anti-cardiolipin syndrome in her maternal grandfather; Autoimmune disease in her biological mother; Heart disease in her paternal grandfather.   Mother had thyroiditis and mesangial proliferative glomerulonephritis in remission for 8 years (but no Lupus).  Strong family history of headaches and migraine.    SOCIAL HISTORY:  reports that she has never smoked. She has never used smokeless tobacco. She reports current alcohol use. She reports that she does not use drugs.    Sirena FREDERICK has a female partner. I have discussed with  her the possible teratogenic effects of some medications and she verbalized understanding.     PHYSICAL EXAMINATION:    Vitals:    10/23/20 1522   BP: 103/68   Pulse: 81   Resp: 16   Temp: 36.8 °C (98.2 °F)   SpO2: 99%      General: Well developed, well nourished, in no acute distress.  Alert and oriented. Fluent with normal language and speech. Face symmetric. Reflexes were absent throughout except for left biceps trace.      Data Reviewed:   I have personally reviewed the brain MRI (patient brought CD to first visit)  She has had an extensive evaluation including brain MRI (3/2020), sinus CT, and extensive blood work that has been unremarkable except for a positive Lyme IgM antibody with negative IgG (interpreted as false positive by Dr. Uriarte). She was diagnosed with mycoplasma infection in February and her fatigue resolved after a course of antibiotics, but the headache continued to worsen. CBC, CMP, ESR, SUBHASH, TSH/T4 and testing for Celiac disease and thyroiditis have been negative. She has seen ENT, ID, Dentist, and Eye doctors and all her evaluations have been negative.   Brain MRI with and without gadolinium (3/2020) normal per my review of the scans and per rad report.    IMPRESSION/PLAN:  Sirena Shin is a very pleasant 20 y.o.  young woman seen for chronic moderate and severe headaches since May 2019. She is concerned about chronic Lyme disease as she found a tick on her scalp in May 2019 when her symptoms started. However, the headaches did not have a sudden onset and they gradually worsened over the summer. She has only been headache free a few day sin the last year, mostly during courses of antibiotics, but the headache always reoccurred within a few days, sometimes even before completing the antibiotic course. She has a strong family history of migraine. Her neurological exam is normal. She had a normal brain MRI (3/2020). Extensive diagnostic evaluation has been unremarkable (ENT x 2,  Ophthalmology, Dentist, and Infectious Disease). In my opinion, she has chronic migraine. She has not tried any headache preventive medications. A prednisone course did not help. Acutely, over-the-counters don't help and rizatriptan did not work, but she has only had a chance to try this once. Below are my recommendations.     Diagnosis:  Chronic migraine without aura     Evaluation:  No additional tests are needed at this time. If there are new symptoms or changes in the characteristics of the headaches, I will re-evaluate Sirena FREDERICK and consider if diagnostic tests are needed.     Treatment plan:     For future consideration - physical therapy. Prefers to wait until she undergoes Orthopedic evaluation.      1. Healthy Habits: The following recommendations can greatly reduce the number and severity of headaches.  · Maintain regular sleep hours and get sufficient sleep (8-9 hours)  · Do not skip meals, especially breakfast  · Drink at least 64 oz or 8 cups of water daily - enough to urinate 5-6 times a day  · Get at least 30 minutes of daily aerobic exercise (enough to increase your heart rate and sweat)  · Keep track of headaches and used of acute medication (prescription or over-the-counter) in a calendar or notebook and bring that to your clinic visits.    2. Acute Treatment (limited to 2 days per week, in general):     Stop tizanidine and rizatriptan.     Trial of sumatriptan 100 mg 1 tab as needed at onset of moderate to severe headache. May repeat 1 tablet 2 hours later. Maximum 2 tablets in 24 hr. Limit to 2 days/week.      Other triptans, ubrogepant, rimegepant, or migranal NS can be tried if needed in the future.   We may add an NSAID and/or an antinausea medication as co-adjuvant if monotherapy is not sufficient.      3. Preventive Treatment (when headaches occur more than 1 day/week or interfere with functioning):     Continue nortriptyline 30 mg every night. We will adjust the dose at the next visit or try  to wean off at that point if headaches are better or side effects are more bothersome.      Other options for oral preventive medications include: amitriptyline, betablockers, topiramate, zonisamide, valproate, verapamil, magnesium, riboflavin, melatonin, gabapentin, pregabaline, duloxetine, candesartan, namenda, escitalopram, venlafaxine, and levetiracetam. I would favor gabapentin and nutraceuticals such as magnesium.    Procedures that can be used to prevent headaches include: botox (if headaches occur 15 or more days/month), nerve blocks and trigger point injections. We recommend to obtain prior authorization from insurance when considering these. Most insurances will require trying 2-3 of the oral medications before they will cover botox.    Anti CGRP monoclonal antibodies (Erenumab or Aimovig, Fremanezumab or Ajovy, Galcanezumab or Emgality, and Eptinezumab or Vyepti) are a new group of treatment available for prevention of migraine. These are only approved for adult use in migraine and would be considered off-label for other headache disorders or in individuals under 18. The long term effects of these treatments remain unknown. Most insurances will require trying 2-3 of the oral medications before they will cover these the anti-CGRP antibodies.     Several non-invasive neuromodulation devices are now available to treat headaches preventively and/or acutely, but they are typically not covered by insurance. At this time, we can consider GammaCore, Cefaly, sTMS Mini, and Nerivio.     Follow-up in the Headache Clinic in January with Wendy Posadas in \[facility]".     We appreciate the opportunity to participate in the care of Sirena FREDERICK.     Please, do not hesitate to call me at [phone] if you have any questions or concerns.          Deborah Wright MD  [facility]  [facility]     I have spent more than 25 min face-to-face with the patient and more than 50% of the time was dedicated to counseling  and coordination of care as described in the above note and printed after visit patient instructions.

## 2020-10-23 NOTE — PATIENT INSTRUCTIONS
Diagnosis:  Chronic migraine without aura     Evaluation:  No additional tests are needed at this time. If there are new symptoms or changes in the characteristics of the headaches, I will re-evaluate Sirena FREDERICK and consider if diagnostic tests are needed.     Treatment plan:     For future consideration - physical therapy. Prefers to wait until she undergoes Orthopedic evaluation.      1. Healthy Habits: The following recommendations can greatly reduce the number and severity of headaches.  · Maintain regular sleep hours and get sufficient sleep (8-9 hours)  · Do not skip meals, especially breakfast  · Drink at least 64 oz or 8 cups of water daily - enough to urinate 5-6 times a day  · Get at least 30 minutes of daily aerobic exercise (enough to increase your heart rate and sweat)  · Keep track of headaches and used of acute medication (prescription or over-the-counter) in a calendar or notebook and bring that to your clinic visits.    2. Acute Treatment (limited to 2 days per week, in general):     Stop tizanidine and rizatriptan.     Trial of sumatriptan 100 mg 1 tab as needed at onset of moderate to severe headache. May repeat 1 tablet 2 hours later. Maximum 2 tablets in 24 hr. Limit to 2 days/week.      Other triptans, ubrogepant, rimegepant, or migranal NS can be tried if needed in the future.   We may add an NSAID and/or an antinausea medication as co-adjuvant if monotherapy is not sufficient.      3. Preventive Treatment (when headaches occur more than 1 day/week or interfere with functioning):     Continue nortriptyline 30 mg every night. We will adjust the dose at the next visit or try to wean off at that point if headaches are better or side effects are more bothersome.      Other options for oral preventive medications include: amitriptyline, betablockers, topiramate, zonisamide, valproate, verapamil, magnesium, riboflavin, melatonin, gabapentin, pregabaline, duloxetine, candesartan, namenda, escitalopram,  venlafaxine, and levetiracetam. I would favor gabapentin and nutraceuticals such as magnesium.    Procedures that can be used to prevent headaches include: botox (if headaches occur 15 or more days/month), nerve blocks and trigger point injections. We recommend to obtain prior authorization from insurance when considering these. Most insurances will require trying 2-3 of the oral medications before they will cover botox.    Anti CGRP monoclonal antibodies (Erenumab or Aimovig, Fremanezumab or Ajovy, Galcanezumab or Emgality, and Eptinezumab or Vyepti) are a new group of treatment available for prevention of migraine. These are only approved for adult use in migraine and would be considered off-label for other headache disorders or in individuals under 18. The long term effects of these treatments remain unknown. Most insurances will require trying 2-3 of the oral medications before they will cover these the anti-CGRP antibodies.     Several non-invasive neuromodulation devices are now available to treat headaches preventively and/or acutely, but they are typically not covered by insurance. At this time, we can consider GammaCore, Cefaly, sTMS Mini, and Nerivio.     Follow-up in the Headache Clinic in January with Wendy Posadas in [facility].    Please schedule your next office visit before leaving today.     Please sign up for [employer] at the check out desk if you don't have access already. This is our preferred method of communication. If you do not have internet access, you can call the Headache Program at [phone] if there are problems before your next visit. Experienced and highly trained medical assistants and nurses handle most of my outpatient calls during office hours. If you experience a medical emergency and cannot wait for a phone call during office hours, please call 911 or go to the nearest emergency room or urgent care center.     It has been a pleasure seeing you today in clinic and look forward to your  next visit.

## 2020-10-23 NOTE — LETTER
2020     Ileana Mills, DO  [address]  [address]  [place] [address]    Patient: Sirena Shin  YOB: 2000  Date of Visit: 10/23/2020      Dear Dr. Mills:    Thank you for referring Sirena Shin to me for evaluation. Below are my notes for this consultation.    If you have questions, please do not hesitate to call me. I look forward to following your patient along with you.         Sincerely,        Deborah Nogueira MD        CC: No Recipients  Deborah Nogueira MD  10/23/2020  4:30 PM  Signed  Patient ID: Sirena Shin                              : 2000  MRN: [id number]                                            VISIT DATE: 10/23/2020   ENCOUNTER PROVIDER: Deborah Nogueira  REFERRING PROVIDER: No ref. provider found    I had the pleasure of evaluating Sirena Shin in the Headache [facility] of [facility] ([facility]) on 10/23/2020 for a follow-up visit. The history was provided by Sirena Shin and supplemented by her medical records.    CHIEF COMPLAINT: Headache.    HISTORY OF PRESENT ILLNESS:  Sirena Shin is a very pleasant 20 y.o. young woman seen for chronic moderate and severe headaches since May 2019. She is concerned about chronic Lyme disease as she found a tick on her scalp in May 2019 when her symptoms started. However, the headaches did not have a sudden onset and they gradually worsened over the summer. She has only been headache free a few day sin the last year, mostly during courses of antibiotics, but the headache always reoccurred within a few days, sometimes even before completing the antibiotic course. She has a strong family history of migraine. Her neurological exam is normal. She had a normal brain MRI (3/2020). Extensive diagnostic evaluation has been unremarkable (ENT x 2, Ophthalmology, Dentist, and Infectious Disease). In my opinion, she has chronic migraine. She has not tried any headache preventive  medications. A prednisone course did not help. Acutely, over-the-counters and rizatriptan have not worked.    Follow-up Clinic Note:  Last clinic visit: 8/14/2020  Overall, Sirena FREDERICK has been getting better since her last visit. Headaches are better but right sided neck pain continues to be present and unchanged.  Changes since last visit: started nortriptyline.   Headache frequency: not daily at this time. They tend to last for several days and get better over the weekend with extra sleep and rest. She recently went for 3 weeks without headache.  Headache characteristics: Unchanged. Throbbing. Right periorbital and frontotemporal and right side of neck and hemicranium.  Preventive therapy: nortriptyline 30 mg QHS. Helping. Causing some dry mouth and constipation, but these are tolerable for now.   Acute therapy: rizatriptan does not help. Zanaflex did not help.   Other medical problems: Denies new medical problems.    Has seen another ID specialist and is planning to see an Orthopaedic doctor for her neck.     PMH/SH/FH: Reviewed and unchanged since previous visit or updated below.    Summary from previous visits.  Visit August 2020  Last clinic visit: 6/12/2020  Overall, Sirena FREDERICK has been getting worse. She describes her headaches being 25% of her symptoms. She also has significant fatigue, nausea and right sided neck pain.  Changes since last visit: prednisone course did not help  Headache frequency: daily with moderate headaches 2 days/weeks. Only 1 severe headache since mid June.  Headache characteristics: Unchanged. Throbbing. Right periorbital and frontotemporal and right side of neck and hemicranium.  Preventive therapy: none. Started chiropractor treatment without any changes.  Acute therapy: tried rizatriptan once without any benefit. No SE.  Other medical problems: Denies new medical problems.    She has had another ENT evaluation that was normal, dental evaluation was normal, and would like to get a second  "ID opinion.    Initial clinic visit (6/12/2020):  Sirena FREDERICK is here for chronic headaches that started in May 2019. The headaches started without known precipitating event (i.e. illness, new medications, head/neck injury, or significant stressor) but she was months later tested for Lyme disease and she recall having found a tick on her scalp in May 2019. Sirena was not a \"headachy\" kid or adolescent, but she reports having had 2 typical migraine headaches in high school that were more intense than her current headaches. Mother, Father, paternal grandmother, and sister have a history of severe headaches and some have been diagnosed with migraine headaches.    Her current headaches started gradually sometime in May 2019. She denies a sudden onset or daily constant pattern from the beginning. She remembers having frequent, but intermittent headaches over the summer, some of them severe enough to require rest. They gradually increased in frequency and by the Fall she was having daily and constant headache. She initially attributed her headaches to sinusitis because of the location of the pain, but she denies other symptoms of sinusitis other than the facial/head pain. She has only been headache free a few day sin the last year, mostly during courses of antibiotics, but the headache always reoccurred within a few days, sometimes even before completing the antibiotic course. She has not tried steroids. She has only taken over-the-counters which have not helped at all.      She has had an extensive evaluation including brain MRI (3/2020), sinus CT, and extensive blood work that has been unremarkable except for a positive Lyme IgM antibody with negative IgG (interpreted as false positive by Dr. Uriarte). She was diagnosed with mycoplasma infection in February and her fatigue resolved after a course of antibiotics, but the headache continued to worsen. CBC, CMP, ESR, SUBHASH, TSH/T4 and testing for Celiac disease and thyroiditis " have been negative. She has seen ENT, ID, and Eye doctors and all her evaluations have been negative.      Details about the headache:  Frequency: Daily, constant 4/10 with exacerbations. Only a few headache free days after antibiotics and after chiropractor treatment for 1 day   Location: around eyes and nose, right side much worse than left. Throbbing in the right hemicranium. Pain radiates to the right side of the neck  Quality: pressure in her face and throbbing in the right side of the head  Severity: 4-8/10, sometimes incapacitating, but not frequently (maybe about 10 times in a year incapacitated for a couple of days)  Duration: constant, always present to some degree.  Timing: worse around dinner time, present upon awakening  Aggravation by regular physical activity: yes  Associated features: denies photophobia and phonophobia (but seeks rest in dark quiet room to sleep), denies nausea and vomiting. Some times experience vertigo when headache is more severe. Brain fog and difficulty focusing associated with the headaches.  Presence of aura: denies visual changes.   Focal neurologic symptoms: no weakness, numbness, coordination or balance problems.  No unilateral cranial autonomic symptoms (lacrimation, conjunctival injection, nasal congestion or rhinorrhea, ptosis, eyelid edema, forehead/facial sweating, miosis) restlessness or agitation.   Positional headache: no   Precipitated by Valsalva maneuvers: no  Neck pain: yes, right sided with headache  Relieving factors: rest in dark quiet room  Exacerbating factors: as below  Related to menstrual cycle: worse around menstruation  Other triggers: alcohol (even small amount and randomly), lack of sleep, fatigue, exercise, possibly stress.  Disability: Unable to perform usual activities (work/school/family/social) completely or partially when headache is severe.    HIT-6 score = 66  PHQ-9 score = 3    Lifestyle:  Sleep: good sleeper. Gets 8 hours per night. Sirena FREDERICK  Kip denies snoring, but may grind her teeth.     Diet: does not skip meals.   Exercise: yes    PAST TREATMENTS/MEDICATIONS:  Preventive:  Nortriptyline 30 mg QHS - helping with headache prevention but causing constipation and dry mouth.  Acute or as needed:  Advil no benefit  Tylenol no benefit  Excedrin migraine - no benefit  Rizatriptan - no benefit. No SE.   Tizanidine - no benefit.  IV medications/Hospitalizations:  None  Non-Pharmacologic:  Chiropractor treatment helped.    MEDICATIONS AT START OF VISIT:    Current Outpatient Medications:   •  nortriptyline (PAMELOR) 10 mg capsule, Take 10 mg every night for 1 week, then 20 mg every night for 1 week, then 30 mg every night., Disp: 90 capsule, Rfl: 3  •  rizatriptan MLT (MAXALT-MLT) 10 mg disintegrating tablet, 1 tab at onset of severe headache. May repeat in 2 hours if needed. Maximum 2 tab/day and 2 days/week., Disp: 9 tablet, Rfl: 3  •  tiZANidine (ZANAFLEX) 2 mg tablet, Take 1-2 tab as needed for neck pain and headache up to three times a day., Disp: 60 tablet, Rfl: 3  •  fluticasone propionate (FLONASE) 50 mcg/actuation nasal spray, INSTILL 2 SPRAYS TO EACH NOSTRIL ONCE DAILY, Disp: , Rfl:   •  predniSONE (DELTASONE) 10 mg tablet, 5 tab daily x 2 days, then 4 tab daily x 2 days, then 3 tab daily x 2 days, then 2 tab daily x 2 days, then 1 tab daily x 2 days, then stop. Take in AM with food. (Patient not taking: Reported on 8/14/2020 ), Disp: 30 tablet, Rfl: 0    ALLERGIES: has No Known Allergies.     REVIEW OF SYSTEMS: As discussed above. Otherwise, all other ROS were reviewed and negative.    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   Per mom she has unusual medical problems.    PAST SURGICAL HISTORY:  has a past surgical history that includes Anterior cruciate ligament repair (Right, 2016) and [place] tooth extraction (Bilateral, 2015).    FAMILY HISTORY: family history includes Anti-cardiolipin  syndrome in her maternal grandfather; Autoimmune disease in her biological mother; Heart disease in her paternal grandfather.   Mother had thyroiditis and mesangial proliferative glomerulonephritis in remission for 8 years (but no Lupus).  Strong family history of headaches and migraine.    SOCIAL HISTORY:  reports that she has never smoked. She has never used smokeless tobacco. She reports current alcohol use. She reports that she does not use drugs.    Sirena FREDERICK has a female partner. I have discussed with her the possible teratogenic effects of some medications and she verbalized understanding.     PHYSICAL EXAMINATION:    Vitals:    10/23/20 1522   BP: 103/68   Pulse: 81   Resp: 16   Temp: 36.8 °C (98.2 °F)   SpO2: 99%      General: Well developed, well nourished, in no acute distress.  Alert and oriented. Fluent with normal language and speech. Face symmetric. Reflexes were absent throughout except for left biceps trace.      Data Reviewed:   I have personally reviewed the brain MRI (patient brought CD to first visit)  She has had an extensive evaluation including brain MRI (3/2020), sinus CT, and extensive blood work that has been unremarkable except for a positive Lyme IgM antibody with negative IgG (interpreted as false positive by Dr. Uriarte). She was diagnosed with mycoplasma infection in February and her fatigue resolved after a course of antibiotics, but the headache continued to worsen. CBC, CMP, ESR, SUBHASH, TSH/T4 and testing for Celiac disease and thyroiditis have been negative. She has seen ENT, ID, Dentist, and Eye doctors and all her evaluations have been negative.   Brain MRI with and without gadolinium (3/2020) normal per my review of the scans and per rad report.    IMPRESSION/PLAN:  Sirena Shin is a very pleasant 20 y.o.  young woman seen for chronic moderate and severe headaches since May 2019. She is concerned about chronic Lyme disease as she found a tick on her scalp in May 2019 when her  symptoms started. However, the headaches did not have a sudden onset and they gradually worsened over the summer. She has only been headache free a few day sin the last year, mostly during courses of antibiotics, but the headache always reoccurred within a few days, sometimes even before completing the antibiotic course. She has a strong family history of migraine. Her neurological exam is normal. She had a normal brain MRI (3/2020). Extensive diagnostic evaluation has been unremarkable (ENT x 2, Ophthalmology, Dentist, and Infectious Disease). In my opinion, she has chronic migraine. She has not tried any headache preventive medications. A prednisone course did not help. Acutely, over-the-counters don't help and rizatriptan did not work, but she has only had a chance to try this once. Below are my recommendations.     Diagnosis:  Chronic migraine without aura     Evaluation:  No additional tests are needed at this time. If there are new symptoms or changes in the characteristics of the headaches, I will re-evaluate Sirena FREDERICK and consider if diagnostic tests are needed.     Treatment plan:     For future consideration - physical therapy. Prefers to wait until she undergoes Orthopedic evaluation.      1. Healthy Habits: The following recommendations can greatly reduce the number and severity of headaches.  · Maintain regular sleep hours and get sufficient sleep (8-9 hours)  · Do not skip meals, especially breakfast  · Drink at least 64 oz or 8 cups of water daily - enough to urinate 5-6 times a day  · Get at least 30 minutes of daily aerobic exercise (enough to increase your heart rate and sweat)  · Keep track of headaches and used of acute medication (prescription or over-the-counter) in a calendar or notebook and bring that to your clinic visits.    2. Acute Treatment (limited to 2 days per week, in general):     Stop tizanidine and rizatriptan.     Trial of sumatriptan 100 mg 1 tab as needed at onset of moderate to  severe headache. May repeat 1 tablet 2 hours later. Maximum 2 tablets in 24 hr. Limit to 2 days/week.      Other triptans, ubrogepant, rimegepant, or migranal NS can be tried if needed in the future.   We may add an NSAID and/or an antinausea medication as co-adjuvant if monotherapy is not sufficient.      3. Preventive Treatment (when headaches occur more than 1 day/week or interfere with functioning):     Continue nortriptyline 30 mg every night. We will adjust the dose at the next visit or try to wean off at that point if headaches are better or side effects are more bothersome.      Other options for oral preventive medications include: amitriptyline, betablockers, topiramate, zonisamide, valproate, verapamil, magnesium, riboflavin, melatonin, gabapentin, pregabaline, duloxetine, candesartan, namenda, escitalopram, venlafaxine, and levetiracetam. I would favor gabapentin and nutraceuticals such as magnesium.    Procedures that can be used to prevent headaches include: botox (if headaches occur 15 or more days/month), nerve blocks and trigger point injections. We recommend to obtain prior authorization from insurance when considering these. Most insurances will require trying 2-3 of the oral medications before they will cover botox.    Anti CGRP monoclonal antibodies (Erenumab or Aimovig, Fremanezumab or Ajovy, Galcanezumab or Emgality, and Eptinezumab or Vyepti) are a new group of treatment available for prevention of migraine. These are only approved for adult use in migraine and would be considered off-label for other headache disorders or in individuals under 18. The long term effects of these treatments remain unknown. Most insurances will require trying 2-3 of the oral medications before they will cover these the anti-CGRP antibodies.     Several non-invasive neuromodulation devices are now available to treat headaches preventively and/or acutely, but they are typically not covered by insurance. At this  time, we can consider GammaCore, Cefaly, sTMS Mini, and Nerivio.     Follow-up in the Headache Clinic in January with Wendy Posadas in [place].     We appreciate the opportunity to participate in the care of Sirena FREDERICK.     Please, do not hesitate to call me at [phone] if you have any questions or concerns.          Deborah Wright MD  [facility]  [facility]     I have spent more than 25 min face-to-face with the patient and more than 50% of the time was dedicated to counseling and coordination of care as described in the above note and printed after visit patient instructions.

## 2021-01-21 NOTE — PROGRESS NOTES
Patient ID: Sirena Shin                              : 2000  MRN: [id number]                                            VISIT DATE: 2021   ENCOUNTER PROVIDER: Wendy Posadas    I had the pleasure of evaluating Sirena Shin in the Headache Program of Neurology ([facility]) on 2021 for a follow-up visit. The history was provided by Sirena Shin and supplemented by her medical records.    CHIEF COMPLAINT: Headache.    PREVIOUSLY DOCUMENTED HISTORY OF PRESENT ILLNESS:  Sirena Shin is a very pleasant 20 y.o. young woman seen for chronic moderate and severe headaches since May 2019. She is concerned about chronic Lyme disease as she found a tick on her scalp in May 2019 when her symptoms started. However, the headaches did not have a sudden onset and they gradually worsened over the summer. She has only been headache free a few day sin the last year, mostly during courses of antibiotics, but the headache always reoccurred within a few days, sometimes even before completing the antibiotic course. She has a strong family history of migraine. Her neurological exam is normal. She had a normal brain MRI (3/2020). Extensive diagnostic evaluation has been unremarkable (ENT x 2, Ophthalmology, Dentist, and Infectious Disease). In my opinion, she has chronic migraine. She has not tried any headache preventive medications. A prednisone course did not help. Acutely, over-the-counters and rizatriptan have not worked.    Follow-up Clinic Note:  Last clinic visit: 10/23/2020  Headache frequency: no migraine in months. She experiences an occasional mild headache if exposed to a trigger, which is relieved with water, rest, and/or ibuprofen   Headache characteristics: Unchanged.   Preventive therapy: nortriptyline 30 mg at bedtime. Reports some acid reflex and dry mouth.   Acute therapy: ibuprofen as needed; prescribed and filled the sumatriptan, but has not used it.   Other medical problems:  Denies new medical problems.   PMH/SH/FH: Reviewed and unchanged since previous visit or updated below.  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     Summary from previous visits.  Follow-up Clinic Note (10/23/2020):  Last clinic visit: 8/14/2020  Overall, Sirena FREDERICK has been getting better since her last visit. Headaches are better but right sided neck pain continues to be present and unchanged.  Changes since last visit: started nortriptyline.   Headache frequency: not daily at this time. They tend to last for several days and get better over the weekend with extra sleep and rest. She recently went for 3 weeks without headache.  Headache characteristics: Unchanged. Throbbing. Right periorbital and frontotemporal and right side of neck and hemicranium.  Preventive therapy: nortriptyline 30 mg QHS. Helping. Causing some dry mouth and constipation, but these are tolerable for now.   Acute therapy: rizatriptan does not help. Zanaflex did not help.   Other medical problems: Denies new medical problems.  Has seen another ID specialist and is planning to see an Orthopaedic doctor for her neck.     Visit August 2020  Last clinic visit: 6/12/2020  Overall, Sirena FREDERICK has been getting worse. She describes her headaches being 25% of her symptoms. She also has significant fatigue, nausea and right sided neck pain.  Changes since last visit: prednisone course did not help  Headache frequency: daily with moderate headaches 2 days/weeks. Only 1 severe headache since mid June.  Headache characteristics: Unchanged. Throbbing. Right periorbital and frontotemporal and right side of neck and hemicranium.  Preventive therapy: none. Started chiropractor treatment without any changes.  Acute therapy: tried rizatriptan once without any benefit. No SE.  Other medical problems: Denies new medical problems.  She has had another ENT evaluation that was normal, dental evaluation was normal, and would like to get a second ID  "opinion.    Initial clinic visit (6/12/2020):  Sirena FREDERICK is here for chronic headaches that started in May 2019. The headaches started without known precipitating event (i.e. illness, new medications, head/neck injury, or significant stressor) but she was months later tested for Lyme disease and she recall having found a tick on her scalp in May 2019. Sirena was not a \"headachy\" kid or adolescent, but she reports having had 2 typical migraine headaches in high school that were more intense than her current headaches. Mother, Father, paternal grandmother, and sister have a history of severe headaches and some have been diagnosed with migraine headaches. Her current headaches started gradually sometime in May 2019. She denies a sudden onset or daily constant pattern from the beginning. She remembers having frequent, but intermittent headaches over the summer, some of them severe enough to require rest. They gradually increased in frequency and by the Fall she was having daily and constant headache. She initially attributed her headaches to sinusitis because of the location of the pain, but she denies other symptoms of sinusitis other than the facial/head pain. She has only been headache free a few day sin the last year, mostly during courses of antibiotics, but the headache always reoccurred within a few days, sometimes even before completing the antibiotic course. She has not tried steroids. She has only taken over-the-counters which have not helped at all.  She has had an extensive evaluation including brain MRI (3/2020), sinus CT, and extensive blood work that has been unremarkable except for a positive Lyme IgM antibody with negative IgG (interpreted as false positive by Dr. Uriarte). She was diagnosed with mycoplasma infection in February and her fatigue resolved after a course of antibiotics, but the headache continued to worsen. CBC, CMP, ESR, SUBHASH, TSH/T4 and testing for Celiac disease and thyroiditis have been " negative. She has seen ENT, ID, and Eye doctors and all her evaluations have been negative.      Details about the headache (at initial visit 6/12/2020):  Frequency: Daily, constant 4/10 with exacerbations. Only a few headache free days after antibiotics and after chiropractor treatment for 1 day   Location: around eyes and nose, right side much worse than left. Throbbing in the right hemicranium. Pain radiates to the right side of the neck  Quality: pressure in her face and throbbing in the right side of the head  Severity: 4-8/10, sometimes incapacitating, but not frequently (maybe about 10 times in a year incapacitated for a couple of days)  Duration: constant, always present to some degree.  Timing: worse around dinner time, present upon awakening  Aggravation by regular physical activity: yes  Associated features: denies photophobia and phonophobia (but seeks rest in dark quiet room to sleep), denies nausea and vomiting. Some times experience vertigo when headache is more severe. Brain fog and difficulty focusing associated with the headaches.  Presence of aura: denies visual changes.   Focal neurologic symptoms: no weakness, numbness, coordination or balance problems.  No unilateral cranial autonomic symptoms (lacrimation, conjunctival injection, nasal congestion or rhinorrhea, ptosis, eyelid edema, forehead/facial sweating, miosis) restlessness or agitation.   Positional headache: no   Precipitated by Valsalva maneuvers: no  Neck pain: yes, right sided with headache  Relieving factors: rest in dark quiet room  Exacerbating factors: as below  Related to menstrual cycle: worse around menstruation  Other triggers: alcohol (even small amount and randomly), lack of sleep, fatigue, exercise, possibly stress.  Disability: Unable to perform usual activities (work/school/family/social) completely or partially when headache is severe.    HIT-6 score = 66  PHQ-9 score = 3    Lifestyle:  Sleep: good sleeper. Gets 8 hours  per night. Sirena Shin denies snoring, but may grind her teeth.     Diet: does not skip meals.   Exercise: yes    PAST TREATMENTS/MEDICATIONS:  Preventive:  Nortriptyline 30 mg QHS - helping with headache prevention but causing constipation and dry mouth.  Acute or as needed:  Advil no benefit  Tylenol no benefit  Excedrin migraine - no benefit  Rizatriptan - no benefit. No SE.   Tizanidine - no benefit.  IV medications/Hospitalizations:  None  Non-Pharmacologic:  Chiropractor treatment helped.    MEDICATIONS AT START OF VISIT:    Current Outpatient Medications:   •  fluticasone propionate (FLONASE) 50 mcg/actuation nasal spray, INSTILL 2 SPRAYS TO EACH NOSTRIL ONCE DAILY, Disp: , Rfl:   •  nortriptyline (PAMELOR) 10 mg capsule, Take 30 mg every night., Disp: 270 capsule, Rfl: 1  •  SUMAtriptan (IMITREX) 100 mg tablet, 1 tab at onset of moderate headache. May repeat once in 2 hours. Maximum 2 tab/day and 2 days/week., Disp: 12 tablet, Rfl: 6    ALLERGIES: has No Known Allergies.     REVIEW OF SYSTEMS: As discussed above. Otherwise, all other ROS were reviewed and negative.    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   Per mom she has unusual medical problems.    PAST SURGICAL HISTORY:  has a past surgical history that includes Anterior cruciate ligament repair (Right, 2016) and [place] tooth extraction (Bilateral, 2015).    FAMILY HISTORY: family history includes Anti-cardiolipin syndrome in her maternal grandfather; Autoimmune disease in her biological mother; Heart disease in her paternal grandfather.   Mother had thyroiditis and mesangial proliferative glomerulonephritis in remission for 8 years (but no Lupus).  Strong family history of headaches and migraine.    SOCIAL HISTORY:  reports that she has never smoked. She has never used smokeless tobacco. She reports current alcohol use. She reports that she does not use drugs.    Sirena FREDERICK has a female  partner. I have discussed with her the possible teratogenic effects of some medications and she verbalized understanding.     PHYSICAL EXAMINATION:    Vitals:    01/22/21 1511   BP: 110/72   Pulse: 74   Resp: 18   SpO2: 98%      General: Well developed, well nourished, in no acute distress.  Alert and oriented. Fluent with normal language and speech. Face symmetric. Reflexes were absent throughout except for left biceps trace.      Data Reviewed:   Dr. Wright previously personally reviewed the following:  Brain MRI (patient brought CD to first visit): She has had an extensive evaluation including brain MRI (3/2020), sinus CT, and extensive blood work that has been unremarkable except for a positive Lyme IgM antibody with negative IgG (interpreted as false positive by Dr. Uriarte). She was diagnosed with mycoplasma infection in February and her fatigue resolved after a course of antibiotics, but the headache continued to worsen. CBC, CMP, ESR, SUBHASH, TSH/T4 and testing for Celiac disease and thyroiditis have been negative. She has seen ENT, ID, Dentist, and Eye doctors and all her evaluations have been negative.   Brain MRI with and without gadolinium (3/2020) normal per my review of the scans and per rad report.    IMPRESSION/PLAN:  Sirena Shin is a very pleasant 20 y.o.  young woman seen for chronic moderate and severe headaches since May 2019. She was concerned about chronic Lyme disease as she found a tick on her scalp in May 2019 when her symptoms started. However, the headaches did not have a sudden onset and they gradually worsened over the summer. She had only been headache free a few days in the last year (2020), mostly during courses of antibiotics, but the headache always reoccurred within a few days, sometimes even before completing the antibiotic course. She has a strong family history of migraine. Her neurological exam was normal. She had a normal brain MRI (3/2020). Extensive diagnostic evaluation  was unremarkable (ENT x 2, Ophthalmology, Dentist, and Infectious Disease). In our opinion, she has chronic migraine.     Since starting nortriptyline, she reports a significant improvement of her headache condition. She will continue with nortriptyline for preventive therapy, and ibuprofen for acute therapy. Below are my detailed recommendations.     Diagnosis:  Chronic migraine without aura     Evaluation:  No additional tests are needed at this time. If there are new symptoms or changes in the characteristics of the headaches, I will re-evaluate Sirena FREDERICK and consider if diagnostic tests are needed.     Treatment plan:     For future consideration - physical therapy. Prefers to wait until she undergoes Orthopedic evaluation.      1. Healthy Habits: The following recommendations can greatly reduce the number and severity of headaches.  · Maintain regular sleep hours and get sufficient sleep (8-9 hours)  · Do not skip meals, especially breakfast  · Drink at least 64 oz or 8 cups of water daily - enough to urinate 5-6 times a day  · Get at least 30 minutes of daily aerobic exercise (enough to increase your heart rate and sweat)  · Keep track of headaches and used of acute medication (prescription or over-the-counter) in a calendar or notebook and bring that to your clinic visits.    2. Acute Treatment (limited to 2 days per week, in general):     Continue with ibuprofen 600 mg as needed.    May take sumatriptan 50 - 100 mg as needed for rescue therapy.     Other triptans, ubrogepant, rimegepant, or Migranal NS can be tried if needed in the future.   We may add an NSAID and/or an antinausea medication as co-adjuvant if monotherapy is not sufficient.      3. Preventive Treatment (when headaches occur more than 1 day/week or interfere with functioning):     Continue with nortriptyline 30 mg at bedtime.   - Discussed Biotene products for dry mouth.     Other options for oral preventive medications include: amitriptyline,  beta blockers, topiramate, zonisamide, valproate, verapamil, magnesium, riboflavin, melatonin, gabapentin, pregabalin, duloxetine, candesartan, Namenda, escitalopram, venlafaxine, and levetiracetam. We would favor gabapentin and nutraceuticals such as magnesium.    Procedures that can be used to prevent headaches include: Botox (if headaches occur 15 or more days/month), nerve blocks and trigger point injections. We recommend to obtain prior authorization from insurance when considering these. Most insurances will require trying 2-3 of the oral medications before they will cover Botox.    Anti CGRP monoclonal antibodies (Aimovig,  Ajovy, Emgality, and Vyepti) are a new group of treatment available for prevention of migraine. The long term effects of these treatments remain unknown. Most insurances will require trying 2-3 of the oral medications before they will cover these the anti-CGRP antibodies.     Several non-invasive neuromodulation devices are now available to treat headaches preventively and/or acutely, but they are typically not covered by insurance. At this time, we can consider gammaCore, Cefaly, sTMS Mini, and Nerivio.     Follow-up in the Headache Clinic 3 months.      We appreciate the opportunity to participate in the care of Sirena FREDERICK.     Please, do not hesitate to call our office at [phone] if you have any questions or concerns.          LENCHO Yo  [facility]    I spent 28 minutes on this date of service performing the following activities: obtaining history, documenting, preparing for visit and providing counseling and education.

## 2021-01-22 ENCOUNTER — OFFICE VISIT (OUTPATIENT)
Dept: NEUROLOGY | Facility: CLINIC | Age: 21
End: 2021-01-22
Payer: COMMERCIAL

## 2021-01-22 VITALS
HEIGHT: 66 IN | RESPIRATION RATE: 18 BRPM | DIASTOLIC BLOOD PRESSURE: 72 MMHG | BODY MASS INDEX: 22.5 KG/M2 | WEIGHT: 140 LBS | HEART RATE: 74 BPM | SYSTOLIC BLOOD PRESSURE: 110 MMHG | OXYGEN SATURATION: 98 %

## 2021-01-22 DIAGNOSIS — G43.711 INTRACTABLE CHRONIC MIGRAINE WITHOUT AURA AND WITH STATUS MIGRAINOSUS: Primary | ICD-10-CM

## 2021-01-22 PROCEDURE — 99213 OFFICE O/P EST LOW 20 MIN: CPT | Performed by: NURSE PRACTITIONER

## 2021-01-22 NOTE — PATIENT INSTRUCTIONS
Diagnosis:  Migraine     Plan:  No tests are needed at this time.    Keep a headache diary and bring to next clinic visit.    Treatment recommendations:     1. Healthy Habits:  - Try stress reduction exercises   - Maintain a steady sleep schedule over the week and weekend and practice good sleep hygiene   - Stay well hydrated  - Eat regular meals throughout the day  - Limit caffeine to < 3 cups of coffee per day  - Try to get at least 30 minutes of daily aerobic exercise 3-5 days per week    2. Acute (as needed) Treatment:    Continue with ibuprofen 600 mg as needed. You have sumatriptan 50 - 100 mg available as needed for rescue therapy.     - Limit to 2-3 days per week to avoid rebound headache    - Acute medications are much more effective when taken at the onset of a headache. Try not to wait until the pain is severe.    3. Preventive Treatment:     Continue with nortriptyline 30 mg at bedtime.     **    Please schedule your next office visit before [facility].     MyChart is our preferred method of communication. If you do not have internet access, you can call the Headache Program at [phone] if there are problems before your next visit. Experienced and highly trained medical assistants and nurses handle most of my outpatient calls during office hours. If you experience a medical emergency and cannot wait for a phone call during office hours, please call 911 or go to the nearest emergency room or urgent care center.     It has been a pleasure seeing you today in clinic and look forward to your next visit.

## 2021-04-13 DIAGNOSIS — Z23 ENCOUNTER FOR IMMUNIZATION: ICD-10-CM

## 2021-04-23 ENCOUNTER — OFFICE VISIT (OUTPATIENT)
Dept: NEUROLOGY | Facility: CLINIC | Age: 21
End: 2021-04-23
Payer: COMMERCIAL

## 2021-04-23 VITALS
SYSTOLIC BLOOD PRESSURE: 114 MMHG | BODY MASS INDEX: 24.16 KG/M2 | HEIGHT: 65 IN | DIASTOLIC BLOOD PRESSURE: 70 MMHG | HEART RATE: 75 BPM | OXYGEN SATURATION: 98 % | RESPIRATION RATE: 16 BRPM | WEIGHT: 145 LBS

## 2021-04-23 DIAGNOSIS — G43.711 INTRACTABLE CHRONIC MIGRAINE WITHOUT AURA AND WITH STATUS MIGRAINOSUS: Primary | ICD-10-CM

## 2021-04-23 PROCEDURE — 3008F BODY MASS INDEX DOCD: CPT | Performed by: PSYCHIATRY & NEUROLOGY

## 2021-04-23 PROCEDURE — 99215 OFFICE O/P EST HI 40 MIN: CPT | Performed by: PSYCHIATRY & NEUROLOGY

## 2021-04-23 RX ORDER — NORTRIPTYLINE HYDROCHLORIDE 10 MG/1
CAPSULE ORAL
Qty: 60 CAPSULE | Refills: 1 | Status: SHIPPED | OUTPATIENT
Start: 2021-04-23 | End: 2021-07-23 | Stop reason: ALTCHOICE

## 2021-04-23 RX ORDER — NARATRIPTAN 2.5 MG/1
TABLET ORAL
Qty: 12 TABLET | Refills: 2 | Status: SHIPPED | OUTPATIENT
Start: 2021-04-23 | End: 2022-07-20

## 2021-04-23 NOTE — PATIENT INSTRUCTIONS
Continue with ibuprofen 600 mg as needed.    For rescue treatment, if ibuprofen doesn't work, trial of naratriptan 2.5 mg 1 tab as needed at onset of moderate to severe headache. May repeat 1 tablet FOUR hours later. Maximum 2 tablets in 24 hr. Limit to 2 days/week.     Discontinue sumatriptan.    Recommend to decrease the dose of nortriptyline to 20 mg at bedtime for 2 weeks, then 10 mg at bedtime for 2-4 weeks, then stop it.    May consider taking magnesium 400-600 mg daily for constipation and headache prevention.    Follow-up in 3 months.      Please schedule your next office visit before leaving today.     Please sign up for Virtual [employer]St at the check out desk if you don't have access already. This is our preferred method of communication. If you do not have internet access, you can call the Headache Program at [phone] if there are problems before your next visit. Experienced and highly trained medical assistants and nurses handle most of my outpatient calls during office hours. If you experience a medical emergency and cannot wait for a phone call during office hours, please call 911 or go to the nearest emergency room or urgent care center.     It has been a pleasure seeing you today in clinic and look forward to your next visit.

## 2021-04-23 NOTE — PROGRESS NOTES
Patient ID: Sirena Shin                              : 2000  MRN: [id number]                                            VISIT DATE: 2021   ENCOUNTER PROVIDER: Deborah Nogueira I had the pleasure of evaluating Sirena Shin in the Headache Program of [facility] ([facility]) on 2021 for a follow-up visit. The history was provided by Sirena Shin and supplemented by her medical records.    CHIEF COMPLAINT: Headache.    PREVIOUSLY DOCUMENTED HISTORY OF PRESENT ILLNESS:  Sirena Shin is a very pleasant 20 y.o. young woman seen for chronic moderate and severe headaches since May 2019. She is concerned about chronic Lyme disease as she found a tick on her scalp in May 2019 when her symptoms started. However, the headaches did not have a sudden onset and they gradually worsened over the summer. She has only been headache free a few day sin the last year, mostly during courses of antibiotics, but the headache always reoccurred within a few days, sometimes even before completing the antibiotic course. She has a strong family history of migraine. Her neurological exam is normal. She had a normal brain MRI (3/2020). Extensive diagnostic evaluation has been unremarkable (ENT x 2, Ophthalmology, Dentist, and Infectious Disease). In our opinion, she has chronic migraine. She has not tried any headache preventive medications. A prednisone course did not help. Acutely, over-the-counters and rizatriptan have not worked.    Follow-up Clinic Note:  Last clinic visit: 2020    At her last visit, she had noticed significant improvement of her headache condition after starting nortriptyline. She has been stable and has only had 1 migraine headache in the last 6 months.     She had only 1 migraine headache in mid February that lasted 5 days. She took sumatriptan + Aleve twice a day for 2 days but had side effects. The headache resolved completely.    She was re-evaluated by ENT due to  waking up with stuffy nose in one side. She is considering having a septoplasty.     Headache frequency: only 1 migraine episode in Feb lasting 7 days. She experiences an occasional mild headache if exposed to a trigger, which is relieved with water, rest, and/or ibuprofen   Headache characteristics: Unchanged.   Preventive therapy: nortriptyline 30 mg at bedtime. Reports some acid reflex and dry mouth.   Acute therapy: ibuprofen usually helps. Sumatriptan has caused side effects twice.    Other medical problems: Denies new medical problems.     PMH/SH/FH: Reviewed and unchanged since previous visit or updated below.    Summary from previous visits.  Visit Jan 2021  Headache frequency: no migraine in months. She experiences an occasional mild headache if exposed to a trigger, which is relieved with water, rest, and/or ibuprofen   Headache characteristics: Unchanged.   Preventive therapy: nortriptyline 30 mg at bedtime. Reports some acid reflex and dry mouth.   Acute therapy: ibuprofen as needed; prescribed and filled the sumatriptan, but has not used it.   Other medical problems: Denies new medical problems.     Follow-up Clinic Note (10/23/2020):  Overall, Sirena FREDERICK has been getting better since her last visit. Headaches are better but right sided neck pain continues to be present and unchanged.  Changes since last visit: started nortriptyline.   Headache frequency: not daily at this time. They tend to last for several days and get better over the weekend with extra sleep and rest. She recently went for 3 weeks without headache.  Headache characteristics: Unchanged. Throbbing. Right periorbital and frontotemporal and right side of neck and hemicranium.  Preventive therapy: nortriptyline 30 mg QHS. Helping. Causing some dry mouth and constipation, but these are tolerable for now.   Acute therapy: rizatriptan does not help. Zanaflex did not help.   Other medical problems: Denies new medical problems.  Has seen another  "ID specialist and is planning to see an Orthopaedic doctor for her neck.     Visit August 2020  Overall, Sirena FREDERICK has been getting worse. She describes her headaches being 25% of her symptoms. She also has significant fatigue, nausea and right sided neck pain.  Changes since last visit: prednisone course did not help  Headache frequency: daily with moderate headaches 2 days/weeks. Only 1 severe headache since mid June.  Headache characteristics: Unchanged. Throbbing. Right periorbital and frontotemporal and right side of neck and hemicranium.  Preventive therapy: none. Started chiropractor treatment without any changes.  Acute therapy: tried rizatriptan once without any benefit. No SE.  Other medical problems: Denies new medical problems.  She has had another ENT evaluation that was normal, dental evaluation was normal, and would like to get a second ID opinion.    Initial clinic visit (6/12/2020):  Sirena FREDERICK is here for chronic headaches that started in May 2019. The headaches started without known precipitating event (i.e. illness, new medications, head/neck injury, or significant stressor) but she was months later tested for Lyme disease and she recall having found a tick on her scalp in May 2019. Sirena was not a \"headachy\" kid or adolescent, but she reports having had 2 typical migraine headaches in high school that were more intense than her current headaches. Mother, Father, paternal grandmother, and sister have a history of severe headaches and some have been diagnosed with migraine headaches. Her current headaches started gradually sometime in May 2019. She denies a sudden onset or daily constant pattern from the beginning. She remembers having frequent, but intermittent headaches over the summer, some of them severe enough to require rest. They gradually increased in frequency and by the Fall she was having daily and constant headache. She initially attributed her headaches to sinusitis because of the location " of the pain, but she denies other symptoms of sinusitis other than the facial/head pain. She has only been headache free a few day sin the last year, mostly during courses of antibiotics, but the headache always reoccurred within a few days, sometimes even before completing the antibiotic course. She has not tried steroids. She has only taken over-the-counters which have not helped at all.  She has had an extensive evaluation including brain MRI (3/2020), sinus CT, and extensive blood work that has been unremarkable except for a positive Lyme IgM antibody with negative IgG (interpreted as false positive by Dr. Uriarte). She was diagnosed with mycoplasma infection in February and her fatigue resolved after a course of antibiotics, but the headache continued to worsen. CBC, CMP, ESR, SUBHASH, TSH/T4 and testing for Celiac disease and thyroiditis have been negative. She has seen ENT, ID, and Eye doctors and all her evaluations have been negative.      Details about the headache (at initial visit 6/12/2020):  Frequency: Daily, constant 4/10 with exacerbations. Only a few headache free days after antibiotics and after chiropractor treatment for 1 day   Location: around eyes and nose, right side much worse than left. Throbbing in the right hemicranium. Pain radiates to the right side of the neck  Quality: pressure in her face and throbbing in the right side of the head  Severity: 4-8/10, sometimes incapacitating, but not frequently (maybe about 10 times in a year incapacitated for a couple of days)  Duration: constant, always present to some degree.  Timing: worse around dinner time, present upon awakening  Aggravation by regular physical activity: yes  Associated features: denies photophobia and phonophobia (but seeks rest in dark quiet room to sleep), denies nausea and vomiting. Some times experience vertigo when headache is more severe. Brain fog and difficulty focusing associated with the headaches.  Presence of aura:  denies visual changes.   Focal neurologic symptoms: no weakness, numbness, coordination or balance problems.  No unilateral cranial autonomic symptoms (lacrimation, conjunctival injection, nasal congestion or rhinorrhea, ptosis, eyelid edema, forehead/facial sweating, miosis) restlessness or agitation.   Positional headache: no   Precipitated by Valsalva maneuvers: no  Neck pain: yes, right sided with headache  Relieving factors: rest in dark quiet room  Exacerbating factors: as below  Related to menstrual cycle: worse around menstruation  Other triggers: alcohol (even small amount and randomly), lack of sleep, fatigue, exercise, possibly stress.  Disability: Unable to perform usual activities (work/school/family/social) completely or partially when headache is severe.    HIT-6 score = 66  PHQ-9 score = 3    Lifestyle:  Sleep: good sleeper. Gets 8 hours per night. Sirena Shin denies snoring, but may grind her teeth.     Diet: does not skip meals.   Exercise: yes    PAST TREATMENTS/MEDICATIONS:  Preventive:  Nortriptyline 30 mg QHS - helping with headache prevention but causing constipation and dry mouth.  Acute or as needed:  Advil no benefit initially, now working for less severe headache.  Tylenol no benefit  Excedrin migraine - no benefit  Rizatriptan - no benefit. No SE.   Sumatriptan - caused side effects twice.  Tizanidine - no benefit.  IV medications/Hospitalizations:  None  Non-Pharmacologic:  Chiropractor treatment helped.    MEDICATIONS AT START OF VISIT:    Current Outpatient Medications:   •  fluticasone propionate (FLONASE) 50 mcg/actuation nasal spray, INSTILL 2 SPRAYS TO EACH NOSTRIL ONCE DAILY, Disp: , Rfl:   •  nortriptyline (PAMELOR) 10 mg capsule, Take 30 mg every night., Disp: 270 capsule, Rfl: 1  •  SUMAtriptan (IMITREX) 100 mg tablet, 1 tab at onset of moderate headache. May repeat once in 2 hours. Maximum 2 tab/day and 2 days/week., Disp: 12 tablet, Rfl: 6    ALLERGIES: has No Known  Allergies.     REVIEW OF SYSTEMS: As discussed above. Otherwise, all other ROS were reviewed and negative.    PAST MEDICAL HISTORY:  has a past medical history of Atypical Kawasaki disease (CMS/HCC) (2002).   Per mom she has unusual medical problems.    PAST SURGICAL HISTORY:  has a past surgical history that includes Anterior cruciate ligament repair (Right, 2016) and [place] tooth extraction (Bilateral, 2015).    FAMILY HISTORY: family history includes Anti-cardiolipin syndrome in her maternal grandfather; Autoimmune disease in her biological mother; Heart disease in her paternal grandfather.   Mother had thyroiditis and mesangial proliferative glomerulonephritis in remission for 8 years (but no Lupus).  Strong family history of headaches and migraine.    SOCIAL HISTORY:  reports that she has never smoked. She has never used smokeless tobacco. She reports current alcohol use. She reports that she does not use drugs.  Graduated from College in May 2020. Works as an  in construction.   Sirena FREDERICK has a female partner. I have discussed with her the possible teratogenic effects of some medications and she verbalized understanding.     PHYSICAL EXAMINATION:    There were no vitals filed for this visit.   General: Well developed, well nourished, in no acute distress.  Alert and oriented. Fluent with normal language and speech. Face symmetric. Reflexes were absent throughout except for left biceps trace.      Data Reviewed:   Dr. Wright previously personally reviewed the following:  Brain MRI (patient brought CD to first visit): She has had an extensive evaluation including brain MRI (3/2020), sinus CT, and extensive blood work that has been unremarkable except for a positive Lyme IgM antibody with negative IgG (interpreted as false positive by Dr. Uriarte). She was diagnosed with mycoplasma infection in February and her fatigue resolved after a course of antibiotics, but the headache continued to worsen. CBC, CMP,  ESR, SUBHASH, TSH/T4 and testing for Celiac disease and thyroiditis have been negative. She has seen ENT, ID, Dentist, and Eye doctors and all her evaluations have been negative.   Brain MRI with and without gadolinium (3/2020) normal per my review of the scans and per rad report.    IMPRESSION/PLAN:  Sirena Shin is a very pleasant 20 y.o.  young woman seen for chronic moderate and severe headaches since May 2019. She was concerned about chronic Lyme disease as she found a tick on her scalp in May 2019 when her symptoms started. However, the headaches did not have a sudden onset and they gradually worsened over the summer. She had only been headache free a few days in the last year (2020), mostly during courses of antibiotics, but the headache always reoccurred within a few days, sometimes even before completing the antibiotic course. She has a strong family history of migraine. Her neurological exam was normal. She had a normal brain MRI (3/2020). Extensive diagnostic evaluation was unremarkable (ENT x 2, Ophthalmology, Dentist, and Infectious Disease).   In our opinion, she has chronic migraine.     At this time she has been stable for 6 months on nortriptyline and would like to wean this off. I think this is reasonable and have given her instructions to discontinue it slowly. She will continue ibuprofen for acute therapy and I have prescribed a trial of naratriptan for rescue treatment and discontinued sumatriptan. Below are my detailed recommendations.     Diagnosis:  Chronic migraine without aura     Evaluation:  No additional tests are needed at this time. If there are new symptoms or changes in the characteristics of the headaches, I will re-evaluate Sirena FREDERICK and consider if diagnostic tests are needed.     Treatment plan:     For future consideration - physical therapy.      1. Healthy Habits: The following recommendations can greatly reduce the number and severity of headaches.  · Maintain regular sleep  hours and get sufficient sleep (8-9 hours)  · Do not skip meals, especially breakfast  · Drink at least 64 oz or 8 cups of water daily - enough to urinate 5-6 times a day  · Get at least 30 minutes of daily aerobic exercise (enough to increase your heart rate and sweat)  · Keep track of headaches and used of acute medication (prescription or over-the-counter) in a calendar or notebook and bring that to your clinic visits.    2. Acute Treatment (limited to 2 days per week, in general):     Continue with ibuprofen 600 mg as needed.    For rescue treatment, if ibuprofen doesn't work, trial of naratriptan 2.5 mg 1 tab as needed at onset of moderate to severe headache. May repeat 1 tablet FOUR hours later. Maximum 2 tablets in 24 hr. Limit to 2 days/week.     Discontinue sumatriptan.    Other triptans, ubrogepant, rimegepant, or Migranal NS can be tried if needed in the future.   We may add an NSAID and/or an antinausea medication as co-adjuvant if monotherapy is not sufficient.      3. Preventive Treatment (when headaches occur more than 1 day/week or interfere with functioning):     Recommend to decrease the dose of nortriptyline to 20 mg at bedtime for 2 weeks, then 10 mg at bedtime for 2-4 weeks, then stop it.    May consider taking magnesium 400-600 mg daily for constipation and headache prevention.    Other options for oral preventive medications include: amitriptyline, beta blockers, topiramate, zonisamide, valproate, verapamil, magnesium, riboflavin, melatonin, gabapentin, pregabalin, duloxetine, candesartan, Namenda, escitalopram, venlafaxine, and levetiracetam. We would favor gabapentin and nutraceuticals.    Procedures that can be used to prevent headaches include: Botox (if headaches occur 15 or more days/month), nerve blocks and trigger point injections. We recommend to obtain prior authorization from insurance when considering these. Most insurances will require trying 2-3 of the oral medications before  they will cover Botox.    Anti CGRP monoclonal antibodies (Aimovig,  Ajovy, Emgality, and Vyepti) are a new group of treatment available for prevention of migraine. The long term effects of these treatments remain unknown. Most insurances will require trying 2-3 of the oral medications before they will cover these the anti-CGRP antibodies.     Several non-invasive neuromodulation devices are now available to treat headaches preventively and/or acutely, but they are typically not covered by insurance. At this time, we can consider gammaCore, Cefaly, sTMS Mini, and Nerivio.     Follow-up in the Headache Clinic in 3 months.      We appreciate the opportunity to participate in the care of Sirena FREDERICK.     Please, do not hesitate to call our office at [phone] if you have any questions or concerns.         Deborah Wright MD  [facility]  [phone]    I spent 42 minutes on this date of service performing the following activities: obtaining history, performing examination, entering orders, documenting, preparing for visit and providing counseling and education.

## 2021-04-23 NOTE — LETTER
2021     Ileana Mills, DO  [address]  [address]  [place] [address]    Patient: Sirena Shin  YOB: 2000  Date of Visit: 2021      Dear Dr. Mills:    Thank you for referring Sirena Shin to me for evaluation. Below are my notes for this consultation.    If you have questions, please do not hesitate to call me. I look forward to following your patient along with you.         Sincerely,        Deborah Nogueira MD        CC: No Recipients  Deborah Nogueira MD  2021  4:48 PM  Signed  Patient ID: Sirena Shin                              : 2000  MRN: [id number]                                            VISIT DATE: 2021   ENCOUNTER PROVIDER: Deborah Nogueira    I had the pleasure of evaluating Sirena Shin in the Headache Program of Neurology ([facility]) on 2021 for a follow-up visit. The history was provided by Sirena Shin and supplemented by her medical records.    CHIEF COMPLAINT: Headache.    PREVIOUSLY DOCUMENTED HISTORY OF PRESENT ILLNESS:  Sirena Shin is a very pleasant 20 y.o. young woman seen for chronic moderate and severe headaches since May 2019. She is concerned about chronic Lyme disease as she found a tick on her scalp in May 2019 when her symptoms started. However, the headaches did not have a sudden onset and they gradually worsened over the summer. She has only been headache free a few day sin the last year, mostly during courses of antibiotics, but the headache always reoccurred within a few days, sometimes even before completing the antibiotic course. She has a strong family history of migraine. Her neurological exam is normal. She had a normal brain MRI (3/2020). Extensive diagnostic evaluation has been unremarkable (ENT x 2, Ophthalmology, Dentist, and Infectious Disease). In our opinion, she has chronic migraine. She has not tried any headache preventive medications. A prednisone  course did not help. Acutely, over-the-counters and rizatriptan have not worked.    Follow-up Clinic Note:  Last clinic visit: 1/22/2020    At her last visit, she had noticed significant improvement of her headache condition after starting nortriptyline. She has been stable and has only had 1 migraine headache in the last 6 months.     She had only 1 migraine headache in mid February that lasted 5 days. She took sumatriptan + Aleve twice a day for 2 days but had side effects. The headache resolved completely.    She was re-evaluated by ENT due to waking up with stuffy nose in one side. She is considering having a septoplasty.     Headache frequency: only 1 migraine episode in Feb lasting 7 days. She experiences an occasional mild headache if exposed to a trigger, which is relieved with water, rest, and/or ibuprofen   Headache characteristics: Unchanged.   Preventive therapy: nortriptyline 30 mg at bedtime. Reports some acid reflex and dry mouth.   Acute therapy: ibuprofen usually helps. Sumatriptan has caused side effects twice.    Other medical problems: Denies new medical problems.     PMH/SH/FH: Reviewed and unchanged since previous visit or updated below.    Summary from previous visits.  Visit Jan 2021  Headache frequency: no migraine in months. She experiences an occasional mild headache if exposed to a trigger, which is relieved with water, rest, and/or ibuprofen   Headache characteristics: Unchanged.   Preventive therapy: nortriptyline 30 mg at bedtime. Reports some acid reflex and dry mouth.   Acute therapy: ibuprofen as needed; prescribed and filled the sumatriptan, but has not used it.   Other medical problems: Denies new medical problems.     Follow-up Clinic Note (10/23/2020):  Overall, Sirena FREDERICK has been getting better since her last visit. Headaches are better but right sided neck pain continues to be present and unchanged.  Changes since last visit: started nortriptyline.   Headache frequency: not  "daily at this time. They tend to last for several days and get better over the weekend with extra sleep and rest. She recently went for 3 weeks without headache.  Headache characteristics: Unchanged. Throbbing. Right periorbital and frontotemporal and right side of neck and hemicranium.  Preventive therapy: nortriptyline 30 mg QHS. Helping. Causing some dry mouth and constipation, but these are tolerable for now.   Acute therapy: rizatriptan does not help. Zanaflex did not help.   Other medical problems: Denies new medical problems.  Has seen another ID specialist and is planning to see an Orthopaedic doctor for her neck.     Visit August 2020  Overall, Sirena FREDERICK has been getting worse. She describes her headaches being 25% of her symptoms. She also has significant fatigue, nausea and right sided neck pain.  Changes since last visit: prednisone course did not help  Headache frequency: daily with moderate headaches 2 days/weeks. Only 1 severe headache since mid June.  Headache characteristics: Unchanged. Throbbing. Right periorbital and frontotemporal and right side of neck and hemicranium.  Preventive therapy: none. Started chiropractor treatment without any changes.  Acute therapy: tried rizatriptan once without any benefit. No SE.  Other medical problems: Denies new medical problems.  She has had another ENT evaluation that was normal, dental evaluation was normal, and would like to get a second ID opinion.    Initial clinic visit (6/12/2020):  Sirena FREDERICK is here for chronic headaches that started in May 2019. The headaches started without known precipitating event (i.e. illness, new medications, head/neck injury, or significant stressor) but she was months later tested for Lyme disease and she recall having found a tick on her scalp in May 2019. Sirena was not a \"headachy\" kid or adolescent, but she reports having had 2 typical migraine headaches in high school that were more intense than her current headaches. Mother, " Father, paternal grandmother, and sister have a history of severe headaches and some have been diagnosed with migraine headaches. Her current headaches started gradually sometime in May 2019. She denies a sudden onset or daily constant pattern from the beginning. She remembers having frequent, but intermittent headaches over the summer, some of them severe enough to require rest. They gradually increased in frequency and by the Fall she was having daily and constant headache. She initially attributed her headaches to sinusitis because of the location of the pain, but she denies other symptoms of sinusitis other than the facial/head pain. She has only been headache free a few day sin the last year, mostly during courses of antibiotics, but the headache always reoccurred within a few days, sometimes even before completing the antibiotic course. She has not tried steroids. She has only taken over-the-counters which have not helped at all.  She has had an extensive evaluation including brain MRI (3/2020), sinus CT, and extensive blood work that has been unremarkable except for a positive Lyme IgM antibody with negative IgG (interpreted as false positive by Dr. Uriarte). She was diagnosed with mycoplasma infection in February and her fatigue resolved after a course of antibiotics, but the headache continued to worsen. CBC, CMP, ESR, SUBHASH, TSH/T4 and testing for Celiac disease and thyroiditis have been negative. She has seen ENT, ID, and Eye doctors and all her evaluations have been negative.      Details about the headache (at initial visit 6/12/2020):  Frequency: Daily, constant 4/10 with exacerbations. Only a few headache free days after antibiotics and after chiropractor treatment for 1 day   Location: around eyes and nose, right side much worse than left. Throbbing in the right hemicranium. Pain radiates to the right side of the neck  Quality: pressure in her face and throbbing in the right side of the  head  Severity: 4-8/10, sometimes incapacitating, but not frequently (maybe about 10 times in a year incapacitated for a couple of days)  Duration: constant, always present to some degree.  Timing: worse around dinner time, present upon awakening  Aggravation by regular physical activity: yes  Associated features: denies photophobia and phonophobia (but seeks rest in dark quiet room to sleep), denies nausea and vomiting. Some times experience vertigo when headache is more severe. Brain fog and difficulty focusing associated with the headaches.  Presence of aura: denies visual changes.   Focal neurologic symptoms: no weakness, numbness, coordination or balance problems.  No unilateral cranial autonomic symptoms (lacrimation, conjunctival injection, nasal congestion or rhinorrhea, ptosis, eyelid edema, forehead/facial sweating, miosis) restlessness or agitation.   Positional headache: no   Precipitated by Valsalva maneuvers: no  Neck pain: yes, right sided with headache  Relieving factors: rest in dark quiet room  Exacerbating factors: as below  Related to menstrual cycle: worse around menstruation  Other triggers: alcohol (even small amount and randomly), lack of sleep, fatigue, exercise, possibly stress.  Disability: Unable to perform usual activities (work/school/family/social) completely or partially when headache is severe.    HIT-6 score = 66  PHQ-9 score = 3    Lifestyle:  Sleep: good sleeper. Gets 8 hours per night. Sirena Shin denies snoring, but may grind her teeth.     Diet: does not skip meals.   Exercise: yes    PAST TREATMENTS/MEDICATIONS:  Preventive:  Nortriptyline 30 mg QHS - helping with headache prevention but causing constipation and dry mouth.  Acute or as needed:  Advil no benefit initially, now working for less severe headache.  Tylenol no benefit  Excedrin migraine - no benefit  Rizatriptan - no benefit. No SE.   Sumatriptan - caused side effects twice.  Tizanidine - no benefit.  IV  medications/Hospitalizations:  None  Non-Pharmacologic:  Chiropractor treatment helped.    MEDICATIONS AT START OF VISIT:    Current Outpatient Medications:   •  fluticasone propionate (FLONASE) 50 mcg/actuation nasal spray, INSTILL 2 SPRAYS TO EACH NOSTRIL ONCE DAILY, Disp: , Rfl:   •  nortriptyline (PAMELOR) 10 mg capsule, Take 30 mg every night., Disp: 270 capsule, Rfl: 1  •  SUMAtriptan (IMITREX) 100 mg tablet, 1 tab at onset of moderate headache. May repeat once in 2 hours. Maximum 2 tab/day and 2 days/week., Disp: 12 tablet, Rfl: 6    ALLERGIES: has No Known Allergies.     REVIEW OF SYSTEMS: As discussed above. Otherwise, all other ROS were reviewed and negative.    PAST MEDICAL HISTORY:  has a past medical history of Atypical Kawasaki disease (CMS/HCC) (2002).   Per mom she has unusual medical problems.    PAST SURGICAL HISTORY:  has a past surgical history that includes Anterior cruciate ligament repair (Right, 2016) and Sprakers tooth extraction (Bilateral, 2015).    FAMILY HISTORY: family history includes Anti-cardiolipin syndrome in her maternal grandfather; Autoimmune disease in her biological mother; Heart disease in her paternal grandfather.   Mother had thyroiditis and mesangial proliferative glomerulonephritis in remission for 8 years (but no Lupus).  Strong family history of headaches and migraine.    SOCIAL HISTORY:  reports that she has never smoked. She has never used smokeless tobacco. She reports current alcohol use. She reports that she does not use drugs.  Graduated from College in May 2020. Works as an  in construction.   Sirena FREDERICK has a female partner. I have discussed with her the possible teratogenic effects of some medications and she verbalized understanding.     PHYSICAL EXAMINATION:    There were no vitals filed for this visit.   General: Well developed, well nourished, in no acute distress.  Alert and oriented. Fluent with normal language and speech. Face symmetric. Reflexes were  absent throughout except for left biceps trace.      Data Reviewed:   Dr. Wright previously personally reviewed the following:  Brain MRI (patient brought CD to first visit): She has had an extensive evaluation including brain MRI (3/2020), sinus CT, and extensive blood work that has been unremarkable except for a positive Lyme IgM antibody with negative IgG (interpreted as false positive by Dr. Uriarte). She was diagnosed with mycoplasma infection in February and her fatigue resolved after a course of antibiotics, but the headache continued to worsen. CBC, CMP, ESR, SUBHASH, TSH/T4 and testing for Celiac disease and thyroiditis have been negative. She has seen ENT, ID, Dentist, and Eye doctors and all her evaluations have been negative.   Brain MRI with and without gadolinium (3/2020) normal per my review of the scans and per rad report.    IMPRESSION/PLAN:  Sirena Shin is a very pleasant 20 y.o.  young woman seen for chronic moderate and severe headaches since May 2019. She was concerned about chronic Lyme disease as she found a tick on her scalp in May 2019 when her symptoms started. However, the headaches did not have a sudden onset and they gradually worsened over the summer. She had only been headache free a few days in the last year (2020), mostly during courses of antibiotics, but the headache always reoccurred within a few days, sometimes even before completing the antibiotic course. She has a strong family history of migraine. Her neurological exam was normal. She had a normal brain MRI (3/2020). Extensive diagnostic evaluation was unremarkable (ENT x 2, Ophthalmology, Dentist, and Infectious Disease).   In our opinion, she has chronic migraine.     At this time she has been stable for 6 months on nortriptyline and would like to wean this off. I think this is reasonable and have given her instructions to discontinue it slowly. She will continue ibuprofen for acute therapy and I have prescribed a trial  of naratriptan for rescue treatment and discontinued sumatriptan. Below are my detailed recommendations.     Diagnosis:  Chronic migraine without aura     Evaluation:  No additional tests are needed at this time. If there are new symptoms or changes in the characteristics of the headaches, I will re-evaluate Sirena FREDERICK and consider if diagnostic tests are needed.     Treatment plan:     For future consideration - physical therapy.      1. Healthy Habits: The following recommendations can greatly reduce the number and severity of headaches.  · Maintain regular sleep hours and get sufficient sleep (8-9 hours)  · Do not skip meals, especially breakfast  · Drink at least 64 oz or 8 cups of water daily - enough to urinate 5-6 times a day  · Get at least 30 minutes of daily aerobic exercise (enough to increase your heart rate and sweat)  · Keep track of headaches and used of acute medication (prescription or over-the-counter) in a calendar or notebook and bring that to your clinic visits.    2. Acute Treatment (limited to 2 days per week, in general):     Continue with ibuprofen 600 mg as needed.    For rescue treatment, if ibuprofen doesn't work, trial of naratriptan 2.5 mg 1 tab as needed at onset of moderate to severe headache. May repeat 1 tablet FOUR hours later. Maximum 2 tablets in 24 hr. Limit to 2 days/week.     Discontinue sumatriptan.    Other triptans, ubrogepant, rimegepant, or Migranal NS can be tried if needed in the future.   We may add an NSAID and/or an antinausea medication as co-adjuvant if monotherapy is not sufficient.      3. Preventive Treatment (when headaches occur more than 1 day/week or interfere with functioning):     Recommend to decrease the dose of nortriptyline to 20 mg at bedtime for 2 weeks, then 10 mg at bedtime for 2-4 weeks, then stop it.    May consider taking magnesium 400-600 mg daily for constipation and headache prevention.    Other options for oral preventive medications include:  amitriptyline, beta blockers, topiramate, zonisamide, valproate, verapamil, magnesium, riboflavin, melatonin, gabapentin, pregabalin, duloxetine, candesartan, Namenda, escitalopram, venlafaxine, and levetiracetam. We would favor gabapentin and nutraceuticals.    Procedures that can be used to prevent headaches include: Botox (if headaches occur 15 or more days/month), nerve blocks and trigger point injections. We recommend to obtain prior authorization from insurance when considering these. Most insurances will require trying 2-3 of the oral medications before they will cover Botox.    Anti CGRP monoclonal antibodies (Aimovig,  Ajovy, Emgality, and Vyepti) are a new group of treatment available for prevention of migraine. The long term effects of these treatments remain unknown. Most insurances will require trying 2-3 of the oral medications before they will cover these the anti-CGRP antibodies.     Several non-invasive neuromodulation devices are now available to treat headaches preventively and/or acutely, but they are typically not covered by insurance. At this time, we can consider gammaCore, Cefaly, sTMS Mini, and Nerivio.     Follow-up in the Headache Clinic in 3 months.      We appreciate the opportunity to participate in the care of Sirena FREDERICK.     Please, do not hesitate to call our office at [phone] if you have any questions or concerns.         Deborah Wright MD  [facility]  [phone]    I spent 42 minutes on this date of service performing the following activities: obtaining history, performing examination, entering orders, documenting, preparing for visit and providing counseling and education.

## 2021-07-19 ENCOUNTER — TELEPHONE (OUTPATIENT)
Dept: SURGERY | Facility: CLINIC | Age: 21
End: 2021-07-19

## 2021-07-19 DIAGNOSIS — M54.9 MID BACK PAIN: ICD-10-CM

## 2021-07-19 DIAGNOSIS — M54.2 NECK PAIN: Primary | ICD-10-CM

## 2021-07-19 NOTE — TELEPHONE ENCOUNTER
"Has no imaging place x ray script in system and advise once done to patient         Who referred you to our office?  Answer:  google    Have you had spine surgery, including a spinal cord stimulator?  a. No >> move on to next question  b. Yes, with Dr. Pantoja >> move on to the next question  c. Yes, with another surgeon >> \"Since you have had prior surgery, the office requires you to drop off or mail in a recent (post-surgery) MRI or CT scan.  Dr. Pantoja will review the imaging study to determine if he is able to offer surgical options.  You are not guaranteed an appointment as Dr. Pantoja may not have a surgical solution for you; however, I am going to ask you a few more questions to get a better idea of how we can help you.\"  i. Tia will not order images for these patients - they must get imaging from another provider (their prior surgeon, PCP, pain management, etc.)  Answer: no      Is your pain related to a worker's compensation injury or a recent (within 2 years) auto accident?  d. No or not a recent accident >> move on to the next question.  e. Yes >> Unfortunately, Dr. Pantoja does not accept these insurances.   i. No need to fill out additional questions, but please forward us the encounter with the above information.  Answer: no      Where is your pain located (neck (cervical), mid (thoracic), or low back (lumbar))?  f. If the patient has multiple areas of pain, please explain that Dr. Pantoja will only be able to focus on ONE area per visit  Answer: neck and mid back       Have you completed an MRI or CT scan of your spine within the last 18 months?  g. If yes, \"please bring the discs and reports of these images to your appointment.\" SCHEDULE WITH DR. PANTOJA  h. If no, \"I can schedule you an appointment with Tia, Dr. Pantoja's physician assistant.  She evaluates patients who do not have MRIs or CTs to help start a treatment plan. If after your initial visit with Tia, it is appropriate to see Dr." "Kit, we will schedule accordingly.\" SCHEDULE WITH TIA (make appointment length 60 minutes)  Answer: no      Have you completed x-rays of your spine within the last year?  i. If yes, please bring the discs and reports of these images to your appointment.  j. If no, the office will order an x-ray for you to complete prior to your visit.  Are you able to complete x-rays in [facility]? If not, please find out where they will be going so that we can fax the script there.  Remind them to bring the disc of their images!!   i. If patients need to get x-rays done prior to their appointment, book them out 7-10 days so they have time to get the images.    Answer: no    Does your pain radiate?   Answer: no    Do you have numbness or tingling?  Answer: no    Do you have weakness?  Answer: yes neck     Have you completed physical therapy?  Answer: no    Have you completed spinal injections?  Answer:no    ** Please forward all completed intake questions to [facility] Provider Pool.   ** After scheduling the patient, please remind patients that Dr. Pantoja cannot prescribe narcotic pain medication or perform epidural injections.   ** New patients must arrive 30 minutes prior to appt and follow-ups arrive 15 min prior for appt.  ** If you do not schedule a patient for an appointment, please give a reason for not scheduling!!    **In summary:  - No MRI or CT >> Tia  - Has MRI or CT and NO prior surgery >> Dr. Pantoja  - Prior surgery with another surgeon >> mail or drop off recent images and we will get back to them    "

## 2021-07-23 ENCOUNTER — TELEMEDICINE (OUTPATIENT)
Dept: NEUROLOGY | Facility: CLINIC | Age: 21
End: 2021-07-23
Payer: COMMERCIAL

## 2021-07-23 DIAGNOSIS — G43.711 INTRACTABLE CHRONIC MIGRAINE WITHOUT AURA AND WITH STATUS MIGRAINOSUS: Primary | ICD-10-CM

## 2021-07-23 PROCEDURE — 99213 OFFICE O/P EST LOW 20 MIN: CPT | Mod: 95 | Performed by: NURSE PRACTITIONER

## 2021-07-23 NOTE — PROGRESS NOTES
Verification of Patient Location:  The patient affirms they are currently located in the following state: [place]    Request for Consent:    Audio and Video Encounter   King, my name is LENCHO Yo. Before we proceed, can you please verify your identification by telling me your full name and date of birth?  Can you tell me who is in the room with you?    You and I are about to have a telemedicine check-in or visit because you have requested it. This is a live video-conference. I am a real person, speaking to you in real time. There is no one else with me on the video-conference. However, when we use ([employer], [employer], etc) it is important for you to know that the video-conference may not be secure or private. I am not recording this conversation and I am asking you not to record it. This telemedicine visit will be billed to your health insurance or you, if you are self-insured. You understand you will be responsible for any copayments or coinsurances that apply to your telemedicine visit.  Communication platform used for this encounter:  Startist Video Visit (no Zoom)     Before starting our telemedicine visit, I am required to get your consent for this virtual check-in or visit by telemedicine. Do you consent?      Patient Response to Request for Consent:  Yes

## 2021-07-23 NOTE — PROGRESS NOTES
Patient ID: Sirena Shin                              : 2000  MRN: [id number]                                            VISIT DATE: 2021   ENCOUNTER PROVIDER: Wendy Posadas I had the pleasure of evaluating Sirena Shin in the Headache Program of Neurology ([facility]) on 2021 for a follow-up visit. The history was provided by Sirena Shin and supplemented by her medical records.    CHIEF COMPLAINT: Headache.    HISTORY OF PRESENT ILLNESS  Sirena Shin is a very pleasant 20 y.o. young woman seen for chronic moderate and severe headaches since May 2019. She is concerned about chronic Lyme disease as she found a tick on her scalp in May 2019 when her symptoms started. However, the headaches did not have a sudden onset and they gradually worsened over the summer. She has only been headache free a few day sin the last year, mostly during courses of antibiotics, but the headache always reoccurred within a few days, sometimes even before completing the antibiotic course. She has a strong family history of migraine. Her neurological exam is normal. She had a normal brain MRI (3/2020). Extensive diagnostic evaluation has been unremarkable (ENT x 2, Ophthalmology, Dentist, and Infectious Disease). In our opinion, she has chronic migraine.     Follow-up Clinic Note:  Last clinic visit: 2021     At the last visit, she had been stable for 6 months on nortriptyline and requested to wean off it.    She successfully tapered off the nortriptyline. The nighttime thirst and constipation side effects resolved.     Since her last visit, she has not experienced any severe headaches. She experiences occasional sinus pressure headaches. She uses Afrin (about once weekly, in consultation with her ENT) and Advil as needed.     Headache frequency: 1 - 2 times/week. More frequent in the summer.   Headache characteristics: Unchanged.    Preventive therapy: none.   Acute therapy: Afrin and  Advil PRN.   Other medical problems: Denies new medical problems. Neck pain about one month ago. Possibly slept funny. Relief with chiropractic manipulation.     PMH/SH/FH: Reviewed and unchanged since previous visit or updated below.    Summary from previous visits.  Visit 4/23/2021   Last clinic visit: 1/22/2020  At her last visit, she had noticed significant improvement of her headache condition after starting nortriptyline. She has been stable and has only had 1 migraine headache in the last 6 months.   She had only 1 migraine headache in mid February that lasted 5 days. She took sumatriptan + Aleve twice a day for 2 days but had side effects. The headache resolved completely.  She was re-evaluated by ENT due to waking up with stuffy nose in one side. She is considering having a septoplasty.   Headache frequency: only 1 migraine episode in Feb lasting 7 days. She experiences an occasional mild headache if exposed to a trigger, which is relieved with water, rest, and/or ibuprofen   Headache characteristics: Unchanged.   Preventive therapy: nortriptyline 30 mg at bedtime. Reports some acid reflex and dry mouth.   Acute therapy: ibuprofen usually helps. Sumatriptan has caused side effects twice.    Other medical problems: Denies new medical problems.     Visit Jan 2021  Headache frequency: no migraine in months. She experiences an occasional mild headache if exposed to a trigger, which is relieved with water, rest, and/or ibuprofen   Headache characteristics: Unchanged.   Preventive therapy: nortriptyline 30 mg at bedtime. Reports some acid reflex and dry mouth.   Acute therapy: ibuprofen as needed; prescribed and filled the sumatriptan, but has not used it.   Other medical problems: Denies new medical problems.     Visit 10/23/2020  Overall, Sirena FREDERICK has been getting better since her last visit. Headaches are better but right sided neck pain continues to be present and unchanged.  Changes since last visit: started  "nortriptyline.   Headache frequency: not daily at this time. They tend to last for several days and get better over the weekend with extra sleep and rest. She recently went for 3 weeks without headache.  Headache characteristics: Unchanged. Throbbing. Right periorbital and frontotemporal and right side of neck and hemicranium.  Preventive therapy: nortriptyline 30 mg QHS. Helping. Causing some dry mouth and constipation, but these are tolerable for now.   Acute therapy: rizatriptan does not help. Zanaflex did not help.   Other medical problems: Denies new medical problems.  Has seen another ID specialist and is planning to see an Orthopaedic doctor for her neck.     Visit August 2020  Overall, Sirena FREDERICK has been getting worse. She describes her headaches being 25% of her symptoms. She also has significant fatigue, nausea and right sided neck pain.  Changes since last visit: prednisone course did not help  Headache frequency: daily with moderate headaches 2 days/weeks. Only 1 severe headache since mid June.  Headache characteristics: Unchanged. Throbbing. Right periorbital and frontotemporal and right side of neck and hemicranium.  Preventive therapy: none. Started chiropractor treatment without any changes.  Acute therapy: tried rizatriptan once without any benefit. No SE.  Other medical problems: Denies new medical problems.  She has had another ENT evaluation that was normal, dental evaluation was normal, and would like to get a second ID opinion.    Initial clinic visit (6/12/2020):  Sirena FREDERICK is here for chronic headaches that started in May 2019. The headaches started without known precipitating event (i.e. illness, new medications, head/neck injury, or significant stressor) but she was months later tested for Lyme disease and she recall having found a tick on her scalp in May 2019. Sirena was not a \"headachy\" kid or adolescent, but she reports having had 2 typical migraine headaches in high school that were more " intense than her current headaches. Mother, Father, paternal grandmother, and sister have a history of severe headaches and some have been diagnosed with migraine headaches. Her current headaches started gradually sometime in May 2019. She denies a sudden onset or daily constant pattern from the beginning. She remembers having frequent, but intermittent headaches over the summer, some of them severe enough to require rest. They gradually increased in frequency and by the Fall she was having daily and constant headache. She initially attributed her headaches to sinusitis because of the location of the pain, but she denies other symptoms of sinusitis other than the facial/head pain. She has only been headache free a few day sin the last year, mostly during courses of antibiotics, but the headache always reoccurred within a few days, sometimes even before completing the antibiotic course. She has not tried steroids. She has only taken over-the-counters which have not helped at all.  She has had an extensive evaluation including brain MRI (3/2020), sinus CT, and extensive blood work that has been unremarkable except for a positive Lyme IgM antibody with negative IgG (interpreted as false positive by Dr. Uriarte). She was diagnosed with mycoplasma infection in February and her fatigue resolved after a course of antibiotics, but the headache continued to worsen. CBC, CMP, ESR, SUBHASH, TSH/T4 and testing for Celiac disease and thyroiditis have been negative. She has seen ENT, ID, and Eye doctors and all her evaluations have been negative.      Details about the headache (at initial visit 6/12/2020):  Frequency: Daily, constant 4/10 with exacerbations. Only a few headache free days after antibiotics and after chiropractor treatment for 1 day   Location: around eyes and nose, right side much worse than left. Throbbing in the right hemicranium. Pain radiates to the right side of the neck  Quality: pressure in her face and  throbbing in the right side of the head  Severity: 4-8/10, sometimes incapacitating, but not frequently (maybe about 10 times in a year incapacitated for a couple of days)  Duration: constant, always present to some degree.  Timing: worse around dinner time, present upon awakening  Aggravation by regular physical activity: yes  Associated features: denies photophobia and phonophobia (but seeks rest in dark quiet room to sleep), denies nausea and vomiting. Some times experience vertigo when headache is more severe. Brain fog and difficulty focusing associated with the headaches.  Presence of aura: denies visual changes.   Focal neurologic symptoms: no weakness, numbness, coordination or balance problems.  No unilateral cranial autonomic symptoms (lacrimation, conjunctival injection, nasal congestion or rhinorrhea, ptosis, eyelid edema, forehead/facial sweating, miosis) restlessness or agitation.   Positional headache: no   Precipitated by Valsalva maneuvers: no  Neck pain: yes, right sided with headache  Relieving factors: rest in dark quiet room  Exacerbating factors: as below  Related to menstrual cycle: worse around menstruation  Other triggers: alcohol (even small amount and randomly), lack of sleep, fatigue, exercise, possibly stress.  Disability: Unable to perform usual activities (work/school/family/social) completely or partially when headache is severe.    HIT-6 score = 66  PHQ-9 score = 3    Lifestyle:  Sleep: good sleeper. Gets 8 hours per night. iSrena Shin denies snoring, but may grind her teeth.     Diet: does not skip meals.   Exercise: yes    PAST TREATMENTS/MEDICATIONS:  Preventive:  Nortriptyline 30 mg QHS - helping with headache prevention but causing constipation and dry mouth.  Acute or as needed:  Advil no benefit initially, now working for less severe headache.  Tylenol no benefit  Excedrin migraine - no benefit  Rizatriptan - no benefit. No SE.   Sumatriptan - caused side effects  twice.  Tizanidine - no benefit.  IV medications/Hospitalizations:  None  Non-Pharmacologic:  Chiropractor treatment helped.    MEDICATIONS AT START OF VISIT:    Current Outpatient Medications:   •  naratriptan (AMERGE) 2.5 mg tablet, 1 tab at onset of severe headache as needed. May repeat once FOUR hours later. Maximum 2 tab in 24 hr., Disp: 12 tablet, Rfl: 2  •  fluticasone propionate (FLONASE) 50 mcg/actuation nasal spray, INSTILL 2 SPRAYS TO EACH NOSTRIL ONCE DAILY, Disp: , Rfl:     ALLERGIES: has No Known Allergies.     REVIEW OF SYSTEMS: As discussed above. Otherwise, all other ROS were reviewed and negative.    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   Per mom she has unusual medical problems.    PAST SURGICAL HISTORY:  has a past surgical history that includes Anterior cruciate ligament repair (Right, 2016) and Glady tooth extraction (Bilateral, 2015).    FAMILY HISTORY: family history includes Anti-cardiolipin syndrome in her maternal grandfather; Autoimmune disease in her biological mother; Heart disease in her paternal grandfather.   Mother had thyroiditis and mesangial proliferative glomerulonephritis in remission for 8 years (but no Lupus).  Strong family history of headaches and migraine.    SOCIAL HISTORY:  reports that she has never smoked. She has never used smokeless tobacco. She reports current alcohol use. She reports that she does not use drugs.  Graduated from College in May 2020. Works as an  in construction.   Sirena FREDERICK has a female partner. I have discussed with her the possible teratogenic effects of some medications and she verbalized understanding.     PHYSICAL EXAMINATION:    There were no vitals filed for this visit.   General: Well developed, well nourished, in no acute distress.  Alert and oriented. Fluent with normal language and speech. Face symmetric.      Data Reviewed:   Dr. Wright previously personally reviewed the  following:  Brain MRI (patient brought CD to first visit): She has had an extensive evaluation including brain MRI (3/2020), sinus CT, and extensive blood work that has been unremarkable except for a positive Lyme IgM antibody with negative IgG (interpreted as false positive by Dr. Uriarte). She was diagnosed with mycoplasma infection in February and her fatigue resolved after a course of antibiotics, but the headache continued to worsen. CBC, CMP, ESR, SUBHASH, TSH/T4 and testing for Celiac disease and thyroiditis have been negative. She has seen ENT, ID, Dentist, and Eye doctors and all her evaluations have been negative.   Brain MRI with and without gadolinium (3/2020) normal per my review of the scans and per rad report.    IMPRESSION/PLAN:  Sirena Shin is a very pleasant 20 y.o.  young woman seen for chronic moderate and severe headaches since May 2019. She was concerned about chronic Lyme disease as she found a tick on her scalp in May 2019 when her symptoms started. However, the headaches did not have a sudden onset and they gradually worsened over the summer. She had only been headache free a few days in the last year (2020), mostly during courses of antibiotics, but the headache always reoccurred within a few days, sometimes even before completing the antibiotic course. She has a strong family history of migraine. Her neurological exam was normal. She had a normal brain MRI (3/2020). Extensive diagnostic evaluation was unremarkable (ENT x 2, Ophthalmology, Dentist, and Infectious Disease).   In our opinion, she has chronic migraine.     Her headache condition is stable. She will continue with Advil as needed for acute therapy. Below are my detailed recommendations.      Diagnosis:  Chronic migraine without aura     Evaluation:  No additional tests are needed at this time. If there are new symptoms or changes in the characteristics of the headaches, I will re-evaluate Sirena FREDERICK and consider if diagnostic  tests are needed.     Treatment plan:     For future consideration - physical therapy.      1. Healthy Habits: The following recommendations can greatly reduce the number and severity of headaches.  · Maintain regular sleep hours and get sufficient sleep (8-9 hours)  · Do not skip meals, especially breakfast  · Drink at least 64 oz or 8 cups of water daily - enough to urinate 5-6 times a day  · Get at least 30 minutes of daily aerobic exercise (enough to increase your heart rate and sweat)  · Keep track of headaches and used of acute medication (prescription or over-the-counter) in a calendar or notebook and bring that to your clinic visits.    2. Acute Treatment (limited to 2 days per week, in general):     Continue with ibuprofen 600 mg as needed.     For rescue treatment, if ibuprofen doesn't work, trial of naratriptan 2.5 mg 1 tab as needed at onset of moderate to severe headache. May repeat 1 tablet FOUR hours later. Maximum 2 tablets in 24 hr. Limit to 2 days/week.     Other triptans, ubrogepant, rimegepant, or Migranal NS can be tried if needed in the future.   We may add an NSAID and/or an antinausea medication as co-adjuvant if monotherapy is not sufficient.      3. Preventive Treatment (when headaches occur more than 1 day/week or interfere with functioning):     Not indicated at this time.     Future consideration: magnesium 400-600 mg daily.     Other options for oral preventive medications include: amitriptyline, beta blockers, topiramate, zonisamide, valproate, verapamil, magnesium, riboflavin, melatonin, gabapentin, pregabalin, duloxetine, candesartan, Namenda, escitalopram, venlafaxine, and levetiracetam. We would favor gabapentin and nutraceuticals.    Procedures that can be used to prevent headaches include: Botox (if headaches occur 15 or more days/month), nerve blocks and trigger point injections. We recommend to obtain prior authorization from insurance when considering these. Most insurances  will require trying 2-3 of the oral medications before they will cover Botox.    Anti CGRP monoclonal antibodies (Aimovig,  Ajovy, Emgality, and Vyepti) are a new group of treatment available for prevention of migraine. The long term effects of these treatments remain unknown. Most insurances will require trying 2-3 of the oral medications before they will cover these the anti-CGRP antibodies.     Several non-invasive neuromodulation devices are now available to treat headaches preventively and/or acutely, but they are typically not covered by insurance. At this time, we can consider gammaCore, Cefaly, sTMS Mini, and Nerivio.     Follow-up in the Headache Clinic in 6 - 9 months.      We appreciate the opportunity to participate in the care of Sirena FREDERICK.     Please, do not hesitate to call our office at [phone] if you have any questions or concerns.         LENCHO Yo  [facility]    I spent 20 minutes on this date of service performing the following activities: obtaining history, documenting, preparing for visit and providing counseling and education.

## 2022-07-20 ENCOUNTER — OFFICE VISIT (OUTPATIENT)
Dept: INTERNAL MEDICINE | Facility: CLINIC | Age: 22
End: 2022-07-20
Payer: COMMERCIAL

## 2022-07-20 VITALS
WEIGHT: 146 LBS | SYSTOLIC BLOOD PRESSURE: 100 MMHG | HEIGHT: 65 IN | HEART RATE: 68 BPM | DIASTOLIC BLOOD PRESSURE: 60 MMHG | RESPIRATION RATE: 18 BRPM | TEMPERATURE: 98 F | BODY MASS INDEX: 24.32 KG/M2 | OXYGEN SATURATION: 98 %

## 2022-07-20 DIAGNOSIS — M30.3 ATYPICAL KAWASAKI DISEASE (CMS/HCC): ICD-10-CM

## 2022-07-20 DIAGNOSIS — Z01.84 IMMUNITY STATUS TESTING: ICD-10-CM

## 2022-07-20 DIAGNOSIS — Z00.00 ROUTINE ADULT HEALTH MAINTENANCE: Primary | ICD-10-CM

## 2022-07-20 DIAGNOSIS — Z83.438 FAMILY HISTORY OF ELEVATED BLOOD LIPIDS: ICD-10-CM

## 2022-07-20 DIAGNOSIS — Z11.59 NEED FOR HEPATITIS C SCREENING TEST: ICD-10-CM

## 2022-07-20 DIAGNOSIS — Z12.4 SCREENING FOR CERVICAL CANCER: ICD-10-CM

## 2022-07-20 DIAGNOSIS — Z11.4 SCREENING FOR HIV (HUMAN IMMUNODEFICIENCY VIRUS): ICD-10-CM

## 2022-07-20 DIAGNOSIS — Z84.1 FAMILY HISTORY OF KIDNEY DISEASE IN MOTHER: ICD-10-CM

## 2022-07-20 DIAGNOSIS — G43.711 INTRACTABLE CHRONIC MIGRAINE WITHOUT AURA AND WITH STATUS MIGRAINOSUS: ICD-10-CM

## 2022-07-20 PROCEDURE — 99385 PREV VISIT NEW AGE 18-39: CPT | Mod: 25 | Performed by: INTERNAL MEDICINE

## 2022-07-20 PROCEDURE — 90715 TDAP VACCINE 7 YRS/> IM: CPT | Performed by: INTERNAL MEDICINE

## 2022-07-20 PROCEDURE — 3008F BODY MASS INDEX DOCD: CPT | Performed by: INTERNAL MEDICINE

## 2022-07-20 PROCEDURE — 90471 IMMUNIZATION ADMIN: CPT | Performed by: INTERNAL MEDICINE

## 2022-07-20 ASSESSMENT — ENCOUNTER SYMPTOMS
SHORTNESS OF BREATH: 0
DIZZINESS: 0
NERVOUS/ANXIOUS: 0
SLEEP DISTURBANCE: 0
APPETITE CHANGE: 0
POLYPHAGIA: 0
UNEXPECTED WEIGHT CHANGE: 0
BLOOD IN STOOL: 0
DYSPHORIC MOOD: 0
DIFFICULTY URINATING: 0
ABDOMINAL PAIN: 0
CONSTIPATION: 0
FATIGUE: 0
ACTIVITY CHANGE: 0
HEADACHES: 0
COUGH: 0
CHEST TIGHTNESS: 0
POLYDIPSIA: 0
DIARRHEA: 0
PALPITATIONS: 0
FREQUENCY: 0
VOMITING: 0
NAUSEA: 0

## 2022-07-20 ASSESSMENT — PATIENT HEALTH QUESTIONNAIRE - PHQ9: SUM OF ALL RESPONSES TO PHQ9 QUESTIONS 1 & 2: 0

## 2022-07-20 NOTE — PROGRESS NOTES
Patient ID: Sirena Shin is a 21 y.o. female.      Establish Care       HPI    Review of Systems   Constitutional: Negative for activity change, appetite change, fatigue and unexpected weight change.   HENT: Negative for dental problem and hearing loss.    Eyes: Negative for visual disturbance.   Respiratory: Negative for cough, chest tightness and shortness of breath.    Cardiovascular: Negative for chest pain, palpitations and leg swelling.   Gastrointestinal: Negative for abdominal pain, blood in stool, constipation, diarrhea, nausea and vomiting.   Endocrine: Negative for cold intolerance, heat intolerance, polydipsia, polyphagia and polyuria.   Genitourinary: Negative for difficulty urinating, frequency and urgency.   Skin: Negative for rash.   Neurological: Negative for dizziness and headaches.   Psychiatric/Behavioral: Negative for dysphoric mood and sleep disturbance. The patient is not nervous/anxious.        No current outpatient medications on file.    No Known Allergies    Past Medical History:   Diagnosis Date   • Atypical Kawasaki disease (CMS/[facility]) 2002       Past Surgical History:   Procedure Laterality Date   • ANTERIOR CRUCIATE LIGAMENT REPAIR Right 2016   • WISDOM TOOTH EXTRACTION Bilateral 2015       Family History   Problem Relation Age of Onset   • Kidney disease Biological Mother         MPGN   • Hyperlipidemia Biological Mother    • Hyperlipidemia Biological Father    • Anti-cardiolipin syndrome Maternal Grandfather    • Early death Paternal Grandfather    • Heart disease Paternal Grandfather         sudden death       Social History     Socioeconomic History   • Marital status: Single     Spouse name: None   • Number of children: None   • Years of education: None   • Highest education level: None   Tobacco Use   • Smoking status: Never Smoker   • Smokeless tobacco: Never Used   Vaping Use   • Vaping Use: Never used   Substance and Sexual Activity   • Alcohol use: Yes      Alcohol/week: 5.0 standard drinks     Types: 2 Glasses of wine, 2 Cans of beer, 1 Shots of liquor per week   • Drug use: Never   • Sexual activity: Yes     Partners: Female, Male     Birth control/protection: Condom   Social History Narrative     at a construction company -- full time    [place]     Exercise - gym 5 x a week / walking       Vitals:    07/20/22 1509   BP: 100/60   Pulse: 68   Resp: 18   Temp: 36.7 °C (98 °F)   SpO2: 98%     Body mass index is 24.3 kg/m².      Wt Readings from Last 3 Encounters:   07/20/22 66.2 kg (146 lb)   04/23/21 65.8 kg (145 lb)   01/22/21 63.5 kg (140 lb)     BP Readings from Last 3 Encounters:   07/20/22 100/60   04/23/21 114/70   01/22/21 110/72     Pulse Readings from Last 3 Encounters:   07/20/22 68   04/23/21 75   01/22/21 74         Physical Exam  Vitals reviewed.   HENT:      Head: Normocephalic and atraumatic.      Right Ear: External ear normal.      Left Ear: External ear normal.   Eyes:      Conjunctiva/sclera: Conjunctivae normal.      Pupils: Pupils are equal, round, and reactive to light.   Neck:      Thyroid: No thyroid mass or thyromegaly.      Vascular: No carotid bruit or JVD.      Trachea: No tracheal deviation.   Cardiovascular:      Rate and Rhythm: Normal rate and regular rhythm.      Heart sounds: Normal heart sounds. No murmur heard.    No friction rub. No gallop.   Pulmonary:      Effort: Pulmonary effort is normal. No respiratory distress.      Breath sounds: Normal breath sounds. No wheezing or rales.   Abdominal:      General: Bowel sounds are normal. There is no distension.      Palpations: Abdomen is soft.      Tenderness: There is no abdominal tenderness.   Musculoskeletal:         General: Normal range of motion.      Cervical back: Normal range of motion and neck supple.   Lymphadenopathy:      Cervical: No cervical adenopathy.   Skin:     General: Skin is warm and dry.   Neurological:      Mental Status: She is alert and oriented to  person, place, and time.   Psychiatric:         Behavior: Behavior normal.         Thought Content: Thought content normal.         Assessment/Plan     Problem List Items Addressed This Visit     Intractable chronic migraine without aura and with status migrainosus    Relevant Orders    CBC and differential    Comprehensive metabolic panel    TSH    Routine adult health maintenance - Primary     · Weight:  Achieve and maintain a healthy weight (Body Mass Index 18-25).  · Physical activity: Avoid inactivity and return to normal activities as soon as possible following diagnosis. Aim to exercise at least 150 minutes per week. Include strength-training exercises at least 2 days per week.  · Nutrition: Consume a healthy plant-based diet. Avoid sugary drinks and limit consumption of energy-dense foods. Eat at least five portions a day of a variety of vegetables, fruits, whole grains, and legumes such as beans. Limit consumption of red meats and avoid processed meats.  · Alcohol: Limit alcoholic drinks to no more than two a day for men and one a day for women.  · Sun Exposure: Limit direct sun exposure, use sunscreen, get annual skin exam. Choose a sunscreen that is broad spectrum protecting against UVA and UVB rays, at least SPF 30, water resistant or very water resistant for up to 40 or 80 mins.   · Tobacco: If you use tobacco products, try to quit. If someone in your household smokes, encourage them to quit.  · Supplements:  Try to obtain nutrients from healthy foods; generally, you should not need a supplement except for specific health problems.  · Immunizations: Annual influenza vaccine/ other vaccines that you qualify for.  Please let me know if you have any questions about these.    · General: Please follow up with your gyn, your dermatologist and your dentist, if you have not already done so. Do not text and drive.            I do recommend the covid 19 vaccine, if you have not had it. I am ready to discuss the  benefits if you would like. You may be due for a  1st booster 5-6 months after your second vaccine    and/or 2nd booster vaccine 4-5 months after your 1st booster if you are over 50 years old.            Please continue to wear a tight fitting mask when in public settings especially when you are indoors and to socially distance (at least 3-6 feet).  These measures may still give you extra protection against covid-19.             Immunity status testing    Relevant Orders    Measles antibody, IgG    Mumps antibody, IgG    Rubella antibody, IgG    Atypical Kawasaki disease (CMS/HCC)    Relevant Orders    Ambulatory referral to Cardiology    CBC and differential    Comprehensive metabolic panel    Screening for cervical cancer    Relevant Orders    Ambulatory referral to Gynecology    Family history of elevated blood lipids    Relevant Orders    Lipid panel    Family history of kidney disease in mother    Need for hepatitis C screening test    Relevant Orders    Hepatitis C antibody    Screening for HIV (human immunodeficiency virus)    Relevant Orders    HIV 1,2 AB P24 AG            Written education and action steps suggested to the patient are documented in After Visit Summary / Patient Instructions sections of this encounter.

## 2022-07-20 NOTE — PATIENT INSTRUCTIONS
Problem List Items Addressed This Visit       Intractable chronic migraine without aura and with status migrainosus    Relevant Orders    CBC and differential    Comprehensive metabolic panel    TSH    Routine adult health maintenance - Primary     Weight:  Achieve and maintain a healthy weight (Body Mass Index 18-25).  Physical activity: Avoid inactivity and return to normal activities as soon as possible following diagnosis. Aim to exercise at least 150 minutes per week. Include strength-training exercises at least 2 days per week.  Nutrition: Consume a healthy plant-based diet. Avoid sugary drinks and limit consumption of energy-dense foods. Eat at least five portions a day of a variety of vegetables, fruits, whole grains, and legumes such as beans. Limit consumption of red meats and avoid processed meats.  Alcohol: Limit alcoholic drinks to no more than two a day for men and one a day for women.  Sun Exposure: Limit direct sun exposure, use sunscreen, get annual skin exam. Choose a sunscreen that is broad spectrum protecting against UVA and UVB rays, at least SPF 30, water resistant or very water resistant for up to 40 or 80 mins.   Tobacco: If you use tobacco products, try to quit. If someone in your household smokes, encourage them to quit.  Supplements:  Try to obtain nutrients from healthy foods; generally, you should not need a supplement except for specific health problems.  Immunizations: Annual influenza vaccine/ other vaccines that you qualify for.  Please let me know if you have any questions about these.    General: Please follow up with your gyn, your dermatologist and your dentist, if you have not already done so. Do not text and drive.            I do recommend the covid 19 vaccine, if you have not had it. I am ready to discuss the benefits if you would like. You may be due for a  1st booster 5-6 months after your second vaccine    and/or 2nd booster vaccine 4-5 months after your 1st booster if you  are over 50 years old.            Please continue to wear a tight fitting mask when in public settings especially when you are indoors and to socially distance (at least 3-6 feet).  These measures may still give you extra protection against covid-19.             Immunity status testing    Relevant Orders    Measles antibody, IgG    Mumps antibody, IgG    Rubella antibody, IgG    Atypical Kawasaki disease (CMS/HCC)    Relevant Orders    Ambulatory referral to Cardiology    CBC and differential    Comprehensive metabolic panel    Screening for cervical cancer    Relevant Orders    Ambulatory referral to Gynecology    Family history of elevated blood lipids    Relevant Orders    Lipid panel          Other Visit Diagnoses       Need for hepatitis C screening test        Relevant Orders    Hepatitis C antibody    Screening for HIV (human immunodeficiency virus)        Relevant Orders    HIV 1,2 AB P24 AG

## 2022-07-20 NOTE — ASSESSMENT & PLAN NOTE
· Weight:  Achieve and maintain a healthy weight (Body Mass Index 18-25).  · Physical activity: Avoid inactivity and return to normal activities as soon as possible following diagnosis. Aim to exercise at least 150 minutes per week. Include strength-training exercises at least 2 days per week.  · Nutrition: Consume a healthy plant-based diet. Avoid sugary drinks and limit consumption of energy-dense foods. Eat at least five portions a day of a variety of vegetables, fruits, whole grains, and legumes such as beans. Limit consumption of red meats and avoid processed meats.  · Alcohol: Limit alcoholic drinks to no more than two a day for men and one a day for women.  · Sun Exposure: Limit direct sun exposure, use sunscreen, get annual skin exam. Choose a sunscreen that is broad spectrum protecting against UVA and UVB rays, at least SPF 30, water resistant or very water resistant for up to 40 or 80 mins.   · Tobacco: If you use tobacco products, try to quit. If someone in your household smokes, encourage them to quit.  · Supplements:  Try to obtain nutrients from healthy foods; generally, you should not need a supplement except for specific health problems.  · Immunizations: Annual influenza vaccine/ other vaccines that you qualify for.  Please let me know if you have any questions about these.    · General: Please follow up with your gyn, your dermatologist and your dentist, if you have not already done so. Do not text and drive.            I do recommend the covid 19 vaccine, if you have not had it. I am ready to discuss the benefits if you would like. You may be due for a  1st booster 5-6 months after your second vaccine    and/or 2nd booster vaccine 4-5 months after your 1st booster if you are over 50 years old.            Please continue to wear a tight fitting mask when in public settings especially when you are indoors and to socially distance (at least 3-6 feet).  These measures may still give you extra  protection against covid-19.

## 2022-07-30 LAB
ALBUMIN SERPL-MCNC: 4.7 G/DL (ref 3.9–5)
ALBUMIN/GLOB SERPL: 1.9 {RATIO} (ref 1.2–2.2)
ALP SERPL-CCNC: 41 IU/L (ref 44–121)
ALT SERPL-CCNC: 16 IU/L (ref 0–32)
AST SERPL-CCNC: 20 IU/L (ref 0–40)
BASOPHILS # BLD AUTO: 0 X10E3/UL (ref 0–0.2)
BASOPHILS NFR BLD AUTO: 1 %
BILIRUB SERPL-MCNC: <0.2 MG/DL (ref 0–1.2)
BUN SERPL-MCNC: 17 MG/DL (ref 6–20)
BUN/CREAT SERPL: 19 (ref 9–23)
CALCIUM SERPL-MCNC: 9.7 MG/DL (ref 8.7–10.2)
CHLORIDE SERPL-SCNC: 103 MMOL/L (ref 96–106)
CHOLEST SERPL-MCNC: 217 MG/DL (ref 100–199)
CO2 SERPL-SCNC: 24 MMOL/L (ref 20–29)
CREAT SERPL-MCNC: 0.9 MG/DL (ref 0.57–1)
EGFRCR SERPLBLD CKD-EPI 2021: 92 ML/MIN/1.73
EOSINOPHIL # BLD AUTO: 0.1 X10E3/UL (ref 0–0.4)
EOSINOPHIL NFR BLD AUTO: 2 %
ERYTHROCYTE [DISTWIDTH] IN BLOOD BY AUTOMATED COUNT: 12.9 % (ref 11.7–15.4)
GLOBULIN SER CALC-MCNC: 2.5 G/DL (ref 1.5–4.5)
GLUCOSE SERPL-MCNC: 79 MG/DL (ref 65–99)
HCT VFR BLD AUTO: 39 % (ref 34–46.6)
HDLC SERPL-MCNC: 74 MG/DL
HGB BLD-MCNC: 12.9 G/DL (ref 11.1–15.9)
IMM GRANULOCYTES # BLD AUTO: 0 X10E3/UL (ref 0–0.1)
IMM GRANULOCYTES NFR BLD AUTO: 0 %
LDLC SERPL CALC-MCNC: 134 MG/DL (ref 0–99)
LYMPHOCYTES # BLD AUTO: 2 X10E3/UL (ref 0.7–3.1)
LYMPHOCYTES NFR BLD AUTO: 48 %
MCH RBC QN AUTO: 30.1 PG (ref 26.6–33)
MCHC RBC AUTO-ENTMCNC: 33.1 G/DL (ref 31.5–35.7)
MCV RBC AUTO: 91 FL (ref 79–97)
MEV IGG SER IA-ACNC: 48.6 AU/ML
MONOCYTES # BLD AUTO: 0.3 X10E3/UL (ref 0.1–0.9)
MONOCYTES NFR BLD AUTO: 8 %
MUV IGG SER IA-ACNC: <9 AU/ML
NEUTROPHILS # BLD AUTO: 1.7 X10E3/UL (ref 1.4–7)
NEUTROPHILS NFR BLD AUTO: 41 %
PLATELET # BLD AUTO: 233 X10E3/UL (ref 150–450)
POTASSIUM SERPL-SCNC: 4.6 MMOL/L (ref 3.5–5.2)
PROT SERPL-MCNC: 7.2 G/DL (ref 6–8.5)
RBC # BLD AUTO: 4.29 X10E6/UL (ref 3.77–5.28)
RUBV IGG SERPL IA-ACNC: 2.81 INDEX
SODIUM SERPL-SCNC: 140 MMOL/L (ref 134–144)
TRIGL SERPL-MCNC: 54 MG/DL (ref 0–149)
TSH SERPL DL<=0.005 MIU/L-ACNC: 2.03 UIU/ML (ref 0.45–4.5)
VLDLC SERPL CALC-MCNC: 9 MG/DL (ref 5–40)
WBC # BLD AUTO: 4.1 X10E3/UL (ref 3.4–10.8)

## 2022-08-03 ENCOUNTER — CLINICAL SUPPORT (OUTPATIENT)
Dept: INTERNAL MEDICINE | Facility: CLINIC | Age: 22
End: 2022-08-03
Payer: COMMERCIAL

## 2022-08-03 DIAGNOSIS — Z23 NEED FOR MMR VACCINE: Primary | ICD-10-CM

## 2022-08-03 LAB
EXPIRATION DATE: NORMAL
Lab: NORMAL
POCT MANUFACTURER: 5001
PREGNANCY TEST URINE, POC: NEGATIVE

## 2022-08-03 PROCEDURE — 81025 URINE PREGNANCY TEST: CPT | Performed by: INTERNAL MEDICINE

## 2022-08-03 PROCEDURE — 90707 MMR VACCINE SC: CPT | Performed by: INTERNAL MEDICINE

## 2022-08-03 PROCEDURE — 90471 IMMUNIZATION ADMIN: CPT | Performed by: INTERNAL MEDICINE

## 2023-05-02 ENCOUNTER — OFFICE VISIT (OUTPATIENT)
Dept: INTERNAL MEDICINE | Facility: CLINIC | Age: 23
End: 2023-05-02
Payer: COMMERCIAL

## 2023-05-02 VITALS
HEIGHT: 65 IN | RESPIRATION RATE: 15 BRPM | HEART RATE: 84 BPM | DIASTOLIC BLOOD PRESSURE: 70 MMHG | BODY MASS INDEX: 24.83 KG/M2 | SYSTOLIC BLOOD PRESSURE: 110 MMHG | OXYGEN SATURATION: 97 % | TEMPERATURE: 98.6 F | WEIGHT: 149 LBS

## 2023-05-02 DIAGNOSIS — M30.3 ATYPICAL KAWASAKI DISEASE (CMS/HCC): ICD-10-CM

## 2023-05-02 DIAGNOSIS — J03.80 ACUTE BACTERIAL TONSILLITIS: Primary | ICD-10-CM

## 2023-05-02 DIAGNOSIS — B96.89 ACUTE BACTERIAL TONSILLITIS: Primary | ICD-10-CM

## 2023-05-02 PROCEDURE — 3008F BODY MASS INDEX DOCD: CPT | Performed by: NURSE PRACTITIONER

## 2023-05-02 PROCEDURE — 99213 OFFICE O/P EST LOW 20 MIN: CPT | Performed by: NURSE PRACTITIONER

## 2023-05-02 RX ORDER — PREDNISONE 10 MG/1
TABLET ORAL
COMMUNITY
Start: 2023-04-30 | End: 2023-05-02

## 2023-05-02 RX ORDER — FLUCONAZOLE 150 MG/1
150 TABLET ORAL DAILY
Qty: 1 TABLET | Refills: 1 | Status: SHIPPED | OUTPATIENT
Start: 2023-05-02 | End: 2023-05-03

## 2023-05-02 RX ORDER — PREDNISONE 10 MG/1
40 TABLET ORAL DAILY
Qty: 20 TABLET | Refills: 0 | OUTPATIENT
Start: 2023-05-02 | End: 2023-05-05

## 2023-05-02 RX ORDER — CLINDAMYCIN HYDROCHLORIDE 300 MG/1
300 CAPSULE ORAL 4 TIMES DAILY
Qty: 40 CAPSULE | Refills: 0 | Status: SHIPPED | OUTPATIENT
Start: 2023-05-02 | End: 2023-05-12

## 2023-05-02 RX ORDER — AMOXICILLIN 500 MG/1
500 CAPSULE ORAL 3 TIMES DAILY
COMMUNITY
Start: 2023-04-26 | End: 2023-05-02

## 2023-05-02 ASSESSMENT — ENCOUNTER SYMPTOMS
CARDIOVASCULAR NEGATIVE: 1
PSYCHIATRIC NEGATIVE: 1
FEVER: 1
NEUROLOGICAL NEGATIVE: 1
HEMATOLOGIC/LYMPHATIC NEGATIVE: 1
MUSCULOSKELETAL NEGATIVE: 1
SORE THROAT: 1
RESPIRATORY NEGATIVE: 1
ENDOCRINE NEGATIVE: 1
GASTROINTESTINAL NEGATIVE: 1
EYES NEGATIVE: 1
ALLERGIC/IMMUNOLOGIC NEGATIVE: 1

## 2023-05-02 ASSESSMENT — PATIENT HEALTH QUESTIONNAIRE - PHQ9: SUM OF ALL RESPONSES TO PHQ9 QUESTIONS 1 & 2: 0

## 2023-05-02 ASSESSMENT — PAIN SCALES - GENERAL: PAINLEVEL: 7

## 2023-05-02 NOTE — ASSESSMENT & PLAN NOTE
--throat cx sent  --broaden to clindamycin-discussed dosage, indications, side effects  --prednisone 40 mg daily x 5 days  --ENT consult  --salt water gargles, as needed tylenol  --diflucan if she develops symptoms of yeast infection  --strict ER precautions  --she will update me on Thursday, may need to expedite ENT appt if no improvement

## 2023-05-02 NOTE — PROGRESS NOTES
[facility]        CHIEF COMPLAINT   URI     HISTORY OF PRESENT ILLNESS      This is a 22 y.o. female who presents with URI. Symptoms started last Tuesday-felt like sinusitis, fevers tmax 100.5 and congestion. Had telemed appt through work and put on amox tid x 10 days. No improvement in symptoms, moved down to throat-severe pain and very swollen glands. Had another telemed appt with started on prednisone taper. Throat and glands very painful  Prone to URI's and prolonged courses of illness-saw specialist at [facility] and unrevealing workup.   PAST MEDICAL AND SURGICAL HISTORY      PMHx:  Past Medical History:   Diagnosis Date   • Atypical Kawasaki disease (CMS/[facility]) 2002     PSHx:  Past Surgical History:   Procedure Laterality Date   • ANTERIOR CRUCIATE LIGAMENT REPAIR Right 2016   • WISDOM TOOTH EXTRACTION Bilateral 2015       Social History     Tobacco Use   • Smoking status: Never   • Smokeless tobacco: Never   Vaping Use   • Vaping status: Never Used     Passive vaping exposure: Yes   Substance Use Topics   • Alcohol use: Yes     Alcohol/week: 5.0 standard drinks of alcohol     Types: 2 Glasses of wine, 2 Cans of beer, 1 Shots of liquor per week   • Drug use: Never       MEDICATIONS      Current Outpatient Medications on File Prior to Visit   Medication Sig Dispense Refill   • amoxicillin (AMOXIL) 500 mg capsule Take 500 mg by mouth 3 (three) times a day.       No current facility-administered medications on file prior to visit.       Home medications were personally reviewed.    ALLERGIES      Patient has no known allergies.    FAMILY HISTORY      Family History   Problem Relation Age of Onset   • Kidney disease Biological Mother         MPGN   • Hyperlipidemia Biological Mother    • Hyperlipidemia Biological Father    • Anti-cardiolipin syndrome Maternal Grandfather    • Early death Paternal Grandfather    • Heart disease Paternal Grandfather         sudden death     REVIEW OF  SYSTEMS      Review of Systems   Constitutional: Positive for fever.   HENT: Positive for sore throat.    Eyes: Negative.    Respiratory: Negative.    Cardiovascular: Negative.    Gastrointestinal: Negative.    Endocrine: Negative.    Genitourinary: Negative.    Musculoskeletal: Negative.    Skin: Negative.    Allergic/Immunologic: Negative.    Neurological: Negative.    Hematological: Negative.    Psychiatric/Behavioral: Negative.      PHYSICAL EXAMINATION      Vitals:    05/02/23 1021   BP: 110/70   Pulse: 84   Resp: 15   Temp: 37 °C (98.6 °F)   SpO2: 97%     Body mass index is 24.79 kg/m².    Physical Exam  Constitutional:       Appearance: She is well-developed. She is ill-appearing.   HENT:      Head: Normocephalic and atraumatic.      Nose: Congestion present.      Mouth/Throat:      Pharynx: Pharyngeal swelling and posterior oropharyngeal erythema present.      Tonsils: Tonsillar exudate present.   Eyes:      Conjunctiva/sclera: Conjunctivae normal.      Pupils: Pupils are equal, round, and reactive to light.   Cardiovascular:      Rate and Rhythm: Normal rate and regular rhythm.   Pulmonary:      Effort: Pulmonary effort is normal.      Breath sounds: Normal breath sounds.   Musculoskeletal:         General: Normal range of motion.      Cervical back: Normal range of motion and neck supple.   Lymphadenopathy:      Cervical: Cervical adenopathy present.   Skin:     General: Skin is warm and dry.      Capillary Refill: Capillary refill takes less than 2 seconds.   Neurological:      Mental Status: She is alert and oriented to person, place, and time.   Psychiatric:         Behavior: Behavior normal.         LABS / IMAGING / STUDIES        Labs  Labs are pending.    Imaging  Not applicable      ASSESSMENT AND PLAN           Problem List Items Addressed This Visit        Immune    Atypical Kawasaki disease (CMS/HCC)     noted            Ears/Nose/Throat    Acute bacterial tonsillitis - Primary     --throat cx  sent  --broaden to clindamycin-discussed dosage, indications, side effects  --prednisone 40 mg daily x 5 days  --ENT consult  --salt water gargles, as needed tylenol  --diflucan if she develops symptoms of yeast infection  --strict ER precautions  --she will update me on Thursday, may need to expedite ENT appt if no improvement          Relevant Medications    amoxicillin (AMOXIL) 500 mg capsule    clindamycin (Cleocin HCL) 300 mg capsule    Other Relevant Orders    Throat culture, comprehensive    Ambulatory referral to ENT    Ambulatory referral to ENT

## 2023-05-04 ENCOUNTER — TELEPHONE (OUTPATIENT)
Dept: INTERNAL MEDICINE | Facility: CLINIC | Age: 23
End: 2023-05-04

## 2023-05-04 NOTE — TELEPHONE ENCOUNTER
Pt calling too update symptoms and follow up to message sent via portal this morning, Still has bad sore throat and swollen lymph nodes, no fever.

## 2023-05-05 ENCOUNTER — HOSPITAL ENCOUNTER (EMERGENCY)
Facility: HOSPITAL | Age: 23
Discharge: HOME | End: 2023-05-05
Attending: EMERGENCY MEDICINE
Payer: COMMERCIAL

## 2023-05-05 ENCOUNTER — TELEPHONE (OUTPATIENT)
Dept: INTERNAL MEDICINE | Facility: CLINIC | Age: 23
End: 2023-05-05

## 2023-05-05 ENCOUNTER — APPOINTMENT (EMERGENCY)
Dept: RADIOLOGY | Facility: HOSPITAL | Age: 23
End: 2023-05-05
Attending: EMERGENCY MEDICINE
Payer: COMMERCIAL

## 2023-05-05 VITALS
SYSTOLIC BLOOD PRESSURE: 115 MMHG | OXYGEN SATURATION: 98 % | DIASTOLIC BLOOD PRESSURE: 52 MMHG | HEART RATE: 61 BPM | HEIGHT: 65 IN | RESPIRATION RATE: 16 BRPM | TEMPERATURE: 98.3 F | BODY MASS INDEX: 24.83 KG/M2 | WEIGHT: 149 LBS

## 2023-05-05 DIAGNOSIS — R74.8 ELEVATED LIVER ENZYMES: ICD-10-CM

## 2023-05-05 DIAGNOSIS — B27.90 PHARYNGITIS DUE TO INFECTIOUS MONONUCLEOSIS: Primary | ICD-10-CM

## 2023-05-05 LAB
ALBUMIN SERPL-MCNC: 3.8 G/DL (ref 3.4–5)
ALP SERPL-CCNC: 262 IU/L (ref 35–126)
ALT SERPL-CCNC: 555 IU/L (ref 11–54)
ANION GAP SERPL CALC-SCNC: 8 MEQ/L (ref 3–15)
AST SERPL-CCNC: 169 IU/L (ref 15–41)
BACTERIA SPEC RESP CULT: NORMAL
BACTERIA SPEC RESP CULT: NORMAL
BASOPHILS # BLD: 0 K/UL (ref 0.01–0.1)
BASOPHILS NFR BLD: 0 %
BILIRUB DIRECT SERPL-MCNC: 0.2 MG/DL
BILIRUB SERPL-MCNC: 0.6 MG/DL (ref 0.3–1.2)
BUN SERPL-MCNC: 18 MG/DL (ref 8–20)
CALCIUM SERPL-MCNC: 8.8 MG/DL (ref 8.9–10.3)
CHLORIDE SERPL-SCNC: 101 MEQ/L (ref 98–109)
CO2 SERPL-SCNC: 27 MEQ/L (ref 22–32)
CREAT SERPL-MCNC: 0.7 MG/DL (ref 0.6–1.1)
DIFFERENTIAL METHOD BLD: ABNORMAL
EOSINOPHIL # BLD: 0 K/UL (ref 0.04–0.36)
EOSINOPHIL NFR BLD: 0 %
ERYTHROCYTE [DISTWIDTH] IN BLOOD BY AUTOMATED COUNT: 13.1 % (ref 11.7–14.4)
FLUAV RNA SPEC QL NAA+PROBE: NEGATIVE
FLUBV RNA SPEC QL NAA+PROBE: NEGATIVE
GFR SERPL CREATININE-BSD FRML MDRD: >60 ML/MIN/1.73M*2
GLUCOSE SERPL-MCNC: 144 MG/DL (ref 70–99)
HCG UR QL: NEGATIVE
HCT VFR BLDCO AUTO: 40.2 % (ref 35–45)
HGB BLD-MCNC: 12.7 G/DL (ref 11.8–15.7)
LYMPHOCYTES # BLD: 1.75 K/UL (ref 1.2–3.5)
LYMPHOCYTES NFR BLD: 24 %
MCH RBC QN AUTO: 29.1 PG (ref 28–33.2)
MCHC RBC AUTO-ENTMCNC: 31.6 G/DL (ref 32.2–35.5)
MCV RBC AUTO: 92.2 FL (ref 83–98)
MONOCYTES # BLD: 0.37 K/UL (ref 0.28–0.8)
MONOCYTES NFR BLD: 5 %
NEUTS BAND # BLD: 0.15 K/UL (ref 0–0.53)
NEUTS BAND # BLD: 4.67 K/UL (ref 1.7–7)
NEUTS BAND NFR BLD: 2 %
NEUTS SEG NFR BLD: 64 %
PDW BLD AUTO: 9.2 FL (ref 9.4–12.3)
PLAT MORPH BLD: NORMAL
PLATELET # BLD AUTO: 207 K/UL (ref 150–369)
PLATELET # BLD EST: ABNORMAL 10*3/UL
POTASSIUM SERPL-SCNC: 4.6 MEQ/L (ref 3.6–5.1)
PROT SERPL-MCNC: 7.9 G/DL (ref 6–8.2)
RBC # BLD AUTO: 4.36 M/UL (ref 3.93–5.22)
RBC MORPH BLD: NORMAL
RSV RNA SPEC QL NAA+PROBE: NEGATIVE
S PYO DNA THROAT QL NAA+PROBE: NOT DETECTED
SARS-COV-2 RNA RESP QL NAA+PROBE: NEGATIVE
SODIUM SERPL-SCNC: 136 MEQ/L (ref 136–144)
VARIANT LYMPHS # BLD MANUAL: 0.37 K/UL
VARIANT LYMPHS NFR BLD: 5 %
WBC # BLD AUTO: 7.3 K/UL (ref 3.8–10.5)

## 2023-05-05 PROCEDURE — 82248 BILIRUBIN DIRECT: CPT | Performed by: PHYSICIAN ASSISTANT

## 2023-05-05 PROCEDURE — 86665 EPSTEIN-BARR CAPSID VCA: CPT | Performed by: EMERGENCY MEDICINE

## 2023-05-05 PROCEDURE — 3E0337Z INTRODUCTION OF ELECTROLYTIC AND WATER BALANCE SUBSTANCE INTO PERIPHERAL VEIN, PERCUTANEOUS APPROACH: ICD-10-PCS | Performed by: EMERGENCY MEDICINE

## 2023-05-05 PROCEDURE — 85025 COMPLETE CBC W/AUTO DIFF WBC: CPT | Performed by: EMERGENCY MEDICINE

## 2023-05-05 PROCEDURE — 96365 THER/PROPH/DIAG IV INF INIT: CPT | Mod: 59

## 2023-05-05 PROCEDURE — 80053 COMPREHEN METABOLIC PANEL: CPT | Performed by: EMERGENCY MEDICINE

## 2023-05-05 PROCEDURE — 87651 STREP A DNA AMP PROBE: CPT | Performed by: EMERGENCY MEDICINE

## 2023-05-05 PROCEDURE — 87637 SARSCOV2&INF A&B&RSV AMP PRB: CPT | Performed by: EMERGENCY MEDICINE

## 2023-05-05 PROCEDURE — 63600000 HC DRUGS/DETAIL CODE: Mod: JW | Performed by: EMERGENCY MEDICINE

## 2023-05-05 PROCEDURE — 96375 TX/PRO/DX INJ NEW DRUG ADDON: CPT | Mod: 59

## 2023-05-05 PROCEDURE — 96361 HYDRATE IV INFUSION ADD-ON: CPT

## 2023-05-05 PROCEDURE — 63700000 HC SELF-ADMINISTRABLE DRUG: Performed by: EMERGENCY MEDICINE

## 2023-05-05 PROCEDURE — 99284 EMERGENCY DEPT VISIT MOD MDM: CPT | Mod: 25

## 2023-05-05 PROCEDURE — 36415 COLL VENOUS BLD VENIPUNCTURE: CPT

## 2023-05-05 PROCEDURE — 86308 HETEROPHILE ANTIBODY SCREEN: CPT | Performed by: EMERGENCY MEDICINE

## 2023-05-05 PROCEDURE — 70491 CT SOFT TISSUE NECK W/DYE: CPT | Mod: MG

## 2023-05-05 PROCEDURE — 25000000 HC PHARMACY GENERAL: Performed by: EMERGENCY MEDICINE

## 2023-05-05 PROCEDURE — 25800000 HC PHARMACY IV SOLUTIONS: Performed by: EMERGENCY MEDICINE

## 2023-05-05 PROCEDURE — 84703 CHORIONIC GONADOTROPIN ASSAY: CPT | Performed by: EMERGENCY MEDICINE

## 2023-05-05 PROCEDURE — 63600105 HC IODINE BASED CONTRAST: Performed by: EMERGENCY MEDICINE

## 2023-05-05 PROCEDURE — 3E03329 INTRODUCTION OF OTHER ANTI-INFECTIVE INTO PERIPHERAL VEIN, PERCUTANEOUS APPROACH: ICD-10-PCS | Performed by: EMERGENCY MEDICINE

## 2023-05-05 PROCEDURE — 3E0333Z INTRODUCTION OF ANTI-INFLAMMATORY INTO PERIPHERAL VEIN, PERCUTANEOUS APPROACH: ICD-10-PCS | Performed by: EMERGENCY MEDICINE

## 2023-05-05 RX ORDER — CLINDAMYCIN PHOSPHATE 600 MG/50ML
600 INJECTION, SOLUTION INTRAVENOUS ONCE
Status: COMPLETED | OUTPATIENT
Start: 2023-05-05 | End: 2023-05-05

## 2023-05-05 RX ORDER — NYSTATIN 100000 [USP'U]/ML
1000000 SUSPENSION ORAL ONCE
Status: COMPLETED | OUTPATIENT
Start: 2023-05-05 | End: 2023-05-05

## 2023-05-05 RX ORDER — SODIUM CHLORIDE 9 MG/ML
INJECTION, SOLUTION INTRAVENOUS CONTINUOUS
Status: DISCONTINUED | OUTPATIENT
Start: 2023-05-05 | End: 2023-05-05 | Stop reason: HOSPADM

## 2023-05-05 RX ORDER — NYSTATIN 100000 [USP'U]/ML
500000 SUSPENSION ORAL 4 TIMES DAILY
Qty: 200 ML | Refills: 0 | Status: SHIPPED | OUTPATIENT
Start: 2023-05-05 | End: 2023-05-15

## 2023-05-05 RX ORDER — DEXAMETHASONE SODIUM PHOSPHATE 4 MG/ML
10 INJECTION, SOLUTION INTRA-ARTICULAR; INTRALESIONAL; INTRAMUSCULAR; INTRAVENOUS; SOFT TISSUE ONCE
Status: COMPLETED | OUTPATIENT
Start: 2023-05-05 | End: 2023-05-05

## 2023-05-05 RX ORDER — METHYLPREDNISOLONE 4 MG/1
TABLET ORAL
Qty: 1 TABLET | Refills: 0 | Status: SHIPPED | OUTPATIENT
Start: 2023-05-05 | End: 2023-05-30

## 2023-05-05 RX ADMIN — SODIUM CHLORIDE: 0.9 INJECTION, SOLUTION INTRAVENOUS at 16:55

## 2023-05-05 RX ADMIN — NYSTATIN 1000000 UNITS: 100000 SUSPENSION ORAL at 14:17

## 2023-05-05 RX ADMIN — SODIUM CHLORIDE 1000 ML: 9 INJECTION, SOLUTION INTRAVENOUS at 13:33

## 2023-05-05 RX ADMIN — SODIUM CHLORIDE 500 ML: 9 INJECTION, SOLUTION INTRAVENOUS at 15:37

## 2023-05-05 RX ADMIN — IOHEXOL 75 ML: 350 INJECTION, SOLUTION INTRAVENOUS at 16:23

## 2023-05-05 RX ADMIN — DEXAMETHASONE SODIUM PHOSPHATE 10 MG: 4 INJECTION, SOLUTION INTRA-ARTICULAR; INTRALESIONAL; INTRAMUSCULAR; INTRAVENOUS; SOFT TISSUE at 13:35

## 2023-05-05 RX ADMIN — CLINDAMYCIN PHOSPHATE 600 MG: 600 INJECTION, SOLUTION INTRAVENOUS at 13:34

## 2023-05-05 ASSESSMENT — ENCOUNTER SYMPTOMS
SORE THROAT: 1
FEVER: 1
CHILLS: 1
TROUBLE SWALLOWING: 1
SHORTNESS OF BREATH: 0
FACIAL SWELLING: 0
ABDOMINAL PAIN: 0
WEAKNESS: 0
NUMBNESS: 0
WHEEZING: 0
COUGH: 0

## 2023-05-05 NOTE — TELEPHONE ENCOUNTER
Patient mother called in says that Sirena is still in a lot of pain and missed a call from the office. She would like Meng Urban CRNP to give her a call back,says that she was told that she could get sirena to see an ENT by the weekend if pain continued.

## 2023-05-05 NOTE — TELEPHONE ENCOUNTER
I called pt tonight.  Left message for her to check in with the answering service tonight if she needs us.  Meng, maybe you could check in with her tomorrow.  Thanks.

## 2023-05-05 NOTE — ED ATTESTATION NOTE
I have personally seen and examined Sirena Shin. I personally performed the key components of the encounter and provided a substantive portion of the care and medical decision making for this patient.    I reviewed and agree with physician assistant’s assessment and plan of care, with any exceptions as documented below.     My focused history, examination, assessment, and plan of care of Sirena Shin is as follows:    Brief History:   22-year-old female with history of Kawasaki disease presented with worsening sore throat for the past week.  Patient stated that she is has has outpatient work-up with negative strep testing was diagnosed with tonsillitis started on antibiotics.  Recently put on clindamycin visited otolaryngologist today and was sent to the emergency department for soft tissue CT neck to rule out tonsillar abscesses.  Focused Physical Exam:   Vitals:    05/05/23 1214   BP: 136/63   Pulse: 78   Resp: 16   Temp: 36.8 °C (98.3 °F)   SpO2: 97%   Awake and alert good eye contact pleasant cooperative with mildly erythematous exudative inflammatory changes to bilateral tonsils with mild enlargement without touching tonsils.  No trismus.  Normal clear speech without respiratory distress.  Afebrile.  Tender bilateral cervical lymphadenopathy      Assessment / Plan / MDM:  Clinical history and exam concerning for but not limited to:  Tonsillitis  Peritonsillar abscess    Otolaryngology group was here to meet and evaluate patient and recommends IV clindamycin and Decadron along with CT soft tissue neck         Nikolay Bedoya MD  05/05/23 6179

## 2023-05-05 NOTE — DISCHARGE INSTRUCTIONS
Do not participate in contact sports until cleared by primary care. Have primary care recheck your liver enzymes to trend and make sure they are improving.     Continue clindamycin to completion.     Discontinue prednisone and start medrol dose pack starting tomorrow.    Use nystatin swish and spit as recommended by ENT today.     Follow up with the following specialist(s): ENT. Call when discharged or first thing in the morning for first available appointment to be seen. Number provided below.     Follow up with primary care in 2-3 days for repeat evaluation.  Call when discharged or first thing in the morning for appointment to be seen.     Return if symptoms change or worsen, difficulty swallowing or tolerating saliva, difficulty breathing, or for any other acute concerns.

## 2023-05-05 NOTE — ED PROVIDER NOTES
Emergency Medicine Note  HPI   HISTORY OF PRESENT ILLNESS     22-year-old female past medical history atypical Kawasaki disease presents from ENT ('s office) with sore throat and need to ro soft tissue neck abscess.  States that last week she had sinus congestion, feeling generally unwell. One week ago she had sore throat, fever. Saw provider over the weekend who placed on steroids and antibiotic.  Symptoms persisted and today patient presented to ear nose and throat doctor due to persistent sore throat.  She was scoped in the office and there was no sign of airway involvement.  There was concern that patient may have a soft tissue abscess in her neck and thus was referred to the emergency department for CT scan.  ENT requested clindamycin, Decadron, nystatin swish and swallow and lab testing including mono to be done.  Patient is tolerating secretions and is having no trouble breathing.            Patient History   PAST HISTORY     Reviewed from Nursing Triage:       Past Medical History:   Diagnosis Date   • Atypical Kawasaki disease (CMS/HCC) 2002       Past Surgical History:   Procedure Laterality Date   • ANTERIOR CRUCIATE LIGAMENT REPAIR Right 2016   • WISDOM TOOTH EXTRACTION Bilateral 2015       Family History   Problem Relation Age of Onset   • Kidney disease Biological Mother         MPGN   • Hyperlipidemia Biological Mother    • Hyperlipidemia Biological Father    • Anti-cardiolipin syndrome Maternal Grandfather    • Early death Paternal Grandfather    • Heart disease Paternal Grandfather         sudden death       Social History     Tobacco Use   • Smoking status: Never   • Smokeless tobacco: Never   Vaping Use   • Vaping status: Never Used     Passive vaping exposure: Yes   Substance Use Topics   • Alcohol use: Yes     Alcohol/week: 5.0 standard drinks of alcohol     Types: 2 Glasses of wine, 2 Cans of beer, 1 Shots of liquor per week   • Drug use: Never         Review of Systems   REVIEW OF  SYSTEMS     Review of Systems   Constitutional: Positive for chills and fever.   HENT: Positive for sore throat and trouble swallowing. Negative for drooling and facial swelling.    Respiratory: Negative for cough, shortness of breath and wheezing.    Cardiovascular: Negative for chest pain and leg swelling.   Gastrointestinal: Negative for abdominal pain.   Neurological: Negative for weakness and numbness.         VITALS     ED Vitals    Date/Time Temp Pulse Resp BP SpO2 [facility]   05/05/23 1734 -- 61 16 115/52 98 % LT   05/05/23 1535 -- -- -- 93/51 -- GT   05/05/23 1528 -- 67 16 98/54 100 % GT   05/05/23 1214 36.8 °C (98.3 °F) 78 16 136/63 97 % JLG                       Physical Exam   PHYSICAL EXAM     Physical Exam  Vitals and nursing note reviewed.   Constitutional:       General: She is not in acute distress.     Appearance: She is not ill-appearing, toxic-appearing or diaphoretic.   HENT:      Mouth/Throat:      Comments: bl nonkissing tonsils, midline uvula, normal phonation, tolerating secretions  Bilateral tonsilar exudate   Cardiovascular:      Pulses: Normal pulses.   Musculoskeletal:      Cervical back: Neck supple. No rigidity.   Lymphadenopathy:      Cervical: Cervical adenopathy present.   Skin:     General: Skin is warm.      Capillary Refill: Capillary refill takes less than 2 seconds.   Neurological:      Mental Status: She is alert.   Psychiatric:         Mood and Affect: Mood normal.         Behavior: Behavior normal.           PROCEDURES     Procedures     DATA     Results     Procedure Component Value Units Date/Time    Hepatic function panel [[id number]]  (Abnormal) Collected: 05/05/23 1234    Specimen: Blood, Venous Updated: 05/05/23 1507     Albumin 3.8 g/dL      Bilirubin, Total 0.6 mg/dL      Bilirubin, Direct 0.2 mg/dL      Alkaline Phosphatase 262 IU/L      AST (SGOT) 169 IU/L      ALT (SGPT) 555 IU/L      Total Protein 7.9 g/dL      Comment: Test performed on plasma which typically contains  approximately 0.4 g/dL more protein than serum.       SARS-CoV-2 (COVID-19), PCR Nasopharynx [[id number]]  (Normal) Collected: 05/05/23 1234    Specimen: Nasopharyngeal Swab from Nasopharynx Updated: 05/05/23 1323    Narrative:      The following orders were created for panel order SARS-CoV-2 (COVID-19), PCR Nasopharynx.  Procedure                               Abnormality         Status                     ---------                               -----------         ------                     SARS-COV-2 (COVID-19)/ F...[[id number]]  Normal              Final result                 Please view results for these tests on the individual orders.    SARS-COV-2 (COVID-19)/ FLU A/B, AND RSV, PCR Nasopharynx [[id number]]  (Normal) Collected: 05/05/23 1234    Specimen: Nasopharyngeal Swab from Nasopharynx Updated: 05/05/23 1323     SARS-CoV-2 (COVID-19) Negative     Influenza A Negative     Influenza B Negative     Respiratory Syncytial Virus Negative    Narrative:      Testing performed using real-time PCR for detection of COVID-19. EUA approved validation studies performed on site.     Group A Strep by PCR, Throat Throat Swab [[id number]]  (Normal) Collected: 05/05/23 1234    Specimen: Throat Swab Updated: 05/05/23 1313     Strep A PCR, Throat Not Detected    CBC and differential [[id number]]  (Abnormal) Collected: 05/05/23 1234    Specimen: Blood, Venous Updated: 05/05/23 1310     WBC 7.30 K/uL      RBC 4.36 M/uL      Hemoglobin 12.7 g/dL      Hematocrit 40.2 %      MCV 92.2 fL      MCH 29.1 pg      MCHC 31.6 g/dL      RDW 13.1 %      Platelets 207 K/uL      MPV 9.2 fL      Differential Type Manu     Neutrophils 64 %      Lymphocytes 24 %      Monocytes 5 %      Eosinophils 0 %      Basophils 0 %      Bands 2 %      Atypical Lymphocytes 5 %      Neutrophils, Absolute 4.67 K/uL      Lymphocytes, Absolute 1.75 K/uL      Monocytes, Absolute 0.37 K/uL      Eosinophils, Absolute 0.00 K/uL      Basophils, Absolute 0.00 K/uL       Bands, Absolute 0.15 K/uL      Atypical Lymphs, Absolute 0.37 K/uL      RBC Morphology Normal     PLT Morphology Normal     Platelet Estimate Adequate (150,000-400,000)    Basic metabolic panel [[id number]]  (Abnormal) Collected: 05/05/23 1234    Specimen: Blood, Venous Updated: 05/05/23 1305     Sodium 136 mEQ/L      Potassium 4.6 mEQ/L      Comment: Results obtained on plasma. Plasma Potassium values may be up to 0.4 mEQ/L less than serum values. The differences may be greater for patients with high platelet or white cell counts.        Chloride 101 mEQ/L      CO2 27 mEQ/L      BUN 18 mg/dL      Creatinine 0.7 mg/dL      Glucose 144 mg/dL      Calcium 8.8 mg/dL      eGFR >60.0 mL/min/1.73m*2      Anion Gap 8 mEQ/L     BhCG, Serum, Qual [[id number]]  (Normal) Collected: 05/05/23 1234    Specimen: Blood, Venous Updated: 05/05/23 1303     Preg Test, Serum Negative    Heterophile AB Reflex EBV Evaluation [[id number]] Collected: 05/05/23 1234    Specimen: Blood, Venous Updated: 05/05/23 1301    Jenny-Barr virus antibody panel [[id number]] Collected: 05/05/23 1234    Specimen: Blood, Venous Updated: 05/05/23 1244          Imaging Results          CT SOFT TISSUE NECK WITH IV CONTRAST (Final result)  Result time 05/05/23 16:45:33    Final result                 Impression:    IMPRESSION:  Adenotonsillar hypertrophy of an infectious or inflammatory etiology.  Associated mildly prominent cervical lymph nodes, as above.             Narrative:      CLINICAL HISTORY: Atypical Kawasaki disease.  Sore throat and fever.    COMMENT:  A contrast enhanced CT scan of the neck was performed utilizing 75 cc of  Omnipaque 350. Sagittal and coronal reformations were obtained.    CT DOSE:  One or more dose reduction techniques (e.g. automated exposure  control, adjustment of the mA and/or kV according to patient size, use of  iterative reconstruction technique) utilized for this examination.    Comparison: None.    Findings:  There is  marked edema and prominence of the nasopharyngeal soft tissue  compatible with adenoidal tissue.  There is also prominence of the palatine  tonsillar tissue with a striated appearance.  These findings are compatible with  adenotonsillar hypertrophy of an infectious or inflammatory etiology.    There are prominent cervical lymph nodes measuring up to 1.6 cm in long axis in  the right level IIA region.    The parotid and submandibular glands are unremarkable. The thyroid gland  enhances homogeneously.    The cervical vessels are grossly unremarkable in course and caliber.    The visualized portions of the lung apices appear clear.  There is straightening  of the normal cervical lordosis but the cervical vertebral bodies are maintained  in height and alignment.  Mild scattered mucosal thickening is present in the  visualized paranasal sinuses.  The visualized portions of the mastoid air cells  appear patent.                                  No orders to display       Scoring tools                                  ED Course & MDM   MDM / ED COURSE / CLINICAL IMPRESSION / DISPO     MDM    ED Course as of 05/05/23 1953   Fri May 05, 2023   3907 ENT nurse practitioner spoke with myself and Dr. Bedoya regarding the patient.  She recommends a CT scan of the neck with contrast to eval for abscess.  She spoke to the patient in the office and has no airway concerns.  She recommends giving the patient clindamycin 600 mg IV now and then Decadron every 8 hours.  She also recommends nystatin swish and swallow which she will order.  Ultrasound the ENT team would like to be contacted at [phone] on completion of studies [SK]   1313 Strep A PCR, Throat: Not Detected [HL]   1314 WBC: 7.30 [HL]   1511 Patient and mom made aware of presumed mono based on diff and elevated liver enzymes and that she should not participate in contact sports until cleared by pcp.  [SK]   1627 Glenn Mattson NP ENT in the ER that patient is pending CT. If patient  has no abscess, can dc home. ENT would continue clinda to completion and change steroid to medrol dose pack. Recommends nystatin swish and spit for home.  [SK]   1639 Case signed out pending CT.  [SK]   1715 CT SOFT TISSUE NECK WITH IV CONTRAST  IMPRESSION:  Adenotonsillar hypertrophy of an infectious or inflammatory etiology.  Associated mildly prominent cervical lymph nodes, as above. [RS]      ED Course User Index  [HL] Nikolay Bedoya MD  [RS] Rico Apodaca PA C  [SK] Aide Lamas PA C     Clinical Impression      Pharyngitis due to infectious mononucleosis   Elevated liver enzymes     _________________     ED Disposition   Discharge                   Aide Lamas PA C  05/05/23 1953

## 2023-05-08 LAB — HETEROPH AB SER QL LA: POSITIVE

## 2023-05-09 LAB
EBV NA 1 IGG SER-ACNC: 0.26
EBV VCA IGG SER IA-ACNC: 0.41
EBV VCA IGM SER IA-ACNC: 3.66
INTERPRETATION: ABNORMAL

## 2023-05-30 ENCOUNTER — OFFICE VISIT (OUTPATIENT)
Dept: INTERNAL MEDICINE | Facility: CLINIC | Age: 23
End: 2023-05-30
Payer: COMMERCIAL

## 2023-05-30 VITALS
SYSTOLIC BLOOD PRESSURE: 112 MMHG | OXYGEN SATURATION: 97 % | RESPIRATION RATE: 16 BRPM | HEART RATE: 72 BPM | DIASTOLIC BLOOD PRESSURE: 72 MMHG | TEMPERATURE: 97.2 F | BODY MASS INDEX: 25.16 KG/M2 | HEIGHT: 65 IN | WEIGHT: 151 LBS

## 2023-05-30 DIAGNOSIS — B07.9 VERRUCAE VULGARIS: ICD-10-CM

## 2023-05-30 DIAGNOSIS — R79.89 ELEVATED LIVER FUNCTION TESTS: ICD-10-CM

## 2023-05-30 DIAGNOSIS — B27.90 INFECTIOUS MONONUCLEOSIS WITHOUT COMPLICATION, INFECTIOUS MONONUCLEOSIS DUE TO UNSPECIFIED ORGANISM: Primary | ICD-10-CM

## 2023-05-30 PROCEDURE — 99213 OFFICE O/P EST LOW 20 MIN: CPT | Performed by: INTERNAL MEDICINE

## 2023-05-30 PROCEDURE — 3008F BODY MASS INDEX DOCD: CPT | Performed by: INTERNAL MEDICINE

## 2023-05-30 ASSESSMENT — ENCOUNTER SYMPTOMS
POLYPHAGIA: 0
CHEST TIGHTNESS: 0
DIARRHEA: 0
DYSPHORIC MOOD: 0
POLYDIPSIA: 0
SLEEP DISTURBANCE: 0
COUGH: 0
ACTIVITY CHANGE: 0
DIZZINESS: 0
PALPITATIONS: 0
APPETITE CHANGE: 0
CONSTIPATION: 0
SHORTNESS OF BREATH: 0
DIFFICULTY URINATING: 0
NAUSEA: 0
ABDOMINAL PAIN: 0
UNEXPECTED WEIGHT CHANGE: 0
SORE THROAT: 0
BLOOD IN STOOL: 0
NERVOUS/ANXIOUS: 0
FATIGUE: 0
FREQUENCY: 0
VOMITING: 0
HEADACHES: 0

## 2023-05-30 NOTE — PATIENT INSTRUCTIONS
Problem List Items Addressed This Visit       Mononucleosis - Primary    Elevated liver function tests     Please avoid alcohol until we get the follow up labs.           Relevant Orders    CBC and differential    Comprehensive metabolic panel    Verrucae vulgaris    Relevant Orders    Ambulatory referral to Dermatology

## 2023-05-30 NOTE — PROGRESS NOTES
Patient ID: Sirena Shin is a 22 y.o. female.      Follow-up       Had mono a few weeks ago.  She is finally better.  She gets sinus infections frequently.  She was seen by ENT and he ordered tests for her.  LFTs were elevated.        Review of Systems   Constitutional: Negative for activity change, appetite change, fatigue and unexpected weight change.   HENT: Negative for congestion, dental problem, hearing loss and sore throat.    Eyes: Negative for visual disturbance.   Respiratory: Negative for cough, chest tightness and shortness of breath.    Cardiovascular: Negative for chest pain, palpitations and leg swelling.   Gastrointestinal: Negative for abdominal pain, blood in stool, constipation, diarrhea, nausea and vomiting.   Endocrine: Negative for cold intolerance, heat intolerance, polydipsia, polyphagia and polyuria.   Genitourinary: Negative for difficulty urinating, frequency and urgency.   Skin: Negative for rash.   Neurological: Negative for dizziness and headaches.   Psychiatric/Behavioral: Negative for dysphoric mood and sleep disturbance. The patient is not nervous/anxious.        No current outpatient medications on file.    No Known Allergies    Past Medical History:   Diagnosis Date   • Atypical Kawasaki disease (CMS/HCC) 2002       Past Surgical History:   Procedure Laterality Date   • ANTERIOR CRUCIATE LIGAMENT REPAIR Right 2016   • WISDOM TOOTH EXTRACTION Bilateral 2015       Family History   Problem Relation Age of Onset   • Kidney disease Biological Mother         MPGN   • Hyperlipidemia Biological Mother    • Hyperlipidemia Biological Father    • Anti-cardiolipin syndrome Maternal Grandfather    • Early death Paternal Grandfather    • Heart disease Paternal Grandfather         sudden death       Social History     Socioeconomic History   • Marital status: Single     Spouse name: None   • Number of children: None   • Years of education: None   • Highest education level: None   Tobacco  Use   • Smoking status: Never   • Smokeless tobacco: Never   Vaping Use   • Vaping status: Never Used     Passive vaping exposure: Yes   Substance and Sexual Activity   • Alcohol use: Yes     Alcohol/week: 5.0 standard drinks of alcohol     Types: 2 Glasses of wine, 2 Cans of beer, 1 Shots of liquor per week   • Drug use: Never   • Sexual activity: Yes     Partners: Female, Male     Birth control/protection: Condom   Social History Narrative     at a construction company -- full time    [employer]     Exercise - gym 5 x a week / walking     Social Determinants of Health     Food Insecurity: No Food Insecurity (5/5/2023)    Hunger Vital Sign    • Worried About Running Out of Food in the Last Year: Never true    • Ran Out of Food in the Last Year: Never true       Vitals:    05/30/23 1535   BP: 112/72   Pulse: 72   Resp: 16   Temp: 36.2 °C (97.2 °F)   SpO2: 97%     Body mass index is 25.13 kg/m².      Wt Readings from Last 3 Encounters:   05/30/23 68.5 kg (151 lb)   05/05/23 67.6 kg (149 lb)   05/02/23 67.6 kg (149 lb)     BP Readings from Last 3 Encounters:   05/30/23 112/72   05/05/23 (!) 115/52   05/02/23 110/70     Pulse Readings from Last 3 Encounters:   05/30/23 72   05/05/23 61   05/02/23 84         Physical Exam  Vitals reviewed.   HENT:      Head: Normocephalic and atraumatic.      Right Ear: External ear normal.      Left Ear: External ear normal.   Eyes:      Conjunctiva/sclera: Conjunctivae normal.      Pupils: Pupils are equal, round, and reactive to light.   Neck:      Thyroid: No thyroid mass or thyromegaly.      Vascular: No carotid bruit or JVD.      Trachea: No tracheal deviation.   Cardiovascular:      Rate and Rhythm: Normal rate and regular rhythm.      Heart sounds: Normal heart sounds. No murmur heard.     No friction rub. No gallop.   Pulmonary:      Effort: Pulmonary effort is normal. No respiratory distress.      Breath sounds: Normal breath sounds. No wheezing or rales.   Abdominal:       General: Bowel sounds are normal. There is no distension.      Palpations: Abdomen is soft.      Tenderness: There is no abdominal tenderness.   Musculoskeletal:         General: Normal range of motion.      Cervical back: Normal range of motion and neck supple.   Lymphadenopathy:      Cervical: No cervical adenopathy.   Skin:     General: Skin is warm and dry.   Neurological:      Mental Status: She is alert and oriented to person, place, and time.   Psychiatric:         Behavior: Behavior normal.         Thought Content: Thought content normal.         Assessment/Plan     Problem List Items Addressed This Visit     Mononucleosis - Primary    Elevated liver function tests     Please avoid alcohol until we get the follow up labs.           Relevant Orders    CBC and differential    Comprehensive metabolic panel    Verrucae vulgaris    Relevant Orders    Ambulatory referral to Dermatology         Written education and action steps suggested to the patient are documented in After Visit Summary / Patient Instructions sections of this encounter.

## 2023-07-08 NOTE — PROGRESS NOTES
Subjective:   7/10/2023  Sirena Shin is a 22 y.o. G0 female who presents for annual exam.       LMP: 6/19/23  Periods are regular every 28-30 days, lasting 5 days. Dysmenorrhea:none. No intermenstrual bleeding, spotting, or discharge.    The patient is sexually active with women   Problems with sexual activity denies      Current contraception: none  H/o fibroids/ovarian cysts: ddenies  History of STDs: denies  Family history of uterine or ovarian cancer: no  Family history of breast cancer: no  Regular self breast exam: yes    Pap smear: 9/10/20 neg cytology  HPV vaccine status: yes    Urinary complaints denies  GI complaints denies  Pt does exercise      Occupation: , went to [employer]        Past Medical History:   Diagnosis Date   • Atypical Kawasaki disease (CMS/HCC) 2002   • Mononucleosis      Past Surgical History:   Procedure Laterality Date   • ANTERIOR CRUCIATE LIGAMENT REPAIR Right 2016   • ANTERIOR CRUCIATE LIGAMENT REPAIR  2015   • WISDOM TOOTH EXTRACTION Bilateral 2015     No current outpatient medications on file.  No Known Allergies  Social History     Socioeconomic History   • Marital status: Single     Spouse name: None   • Number of children: None   • Years of education: None   • Highest education level: None   Tobacco Use   • Smoking status: Never   • Smokeless tobacco: Never   Vaping Use   • Vaping Use: Never used   Substance and Sexual Activity   • Alcohol use: Yes     Alcohol/week: 5.0 standard drinks of alcohol     Types: 2 Glasses of wine, 2 Cans of beer, 1 Shots of liquor per week   • Drug use: Never   • Sexual activity: Yes     Partners: Female, Male     Birth control/protection: Condom   Social History Narrative     at a construction company -- full time    [employer]     Exercise - gym 5 x a week / walking     Social Determinants of Health     Food Insecurity: No Food Insecurity (5/5/2023)    Hunger Vital Sign    • Worried About Running Out of Food in the Last Year: Never  "true    • Ran Out of Food in the Last Year: Never true      Family History   Problem Relation Age of Onset   • Kidney disease Biological Mother         MPGN   • Hyperlipidemia Biological Mother    • Hyperlipidemia Biological Father    • Anti-cardiolipin syndrome Maternal Grandfather    • Early death Paternal Grandfather    • Heart disease Paternal Grandfather         sudden death        Review of Systems   Constitutional: Negative.    HENT: Negative.    Respiratory: Negative.    Cardiovascular: Negative.    Gastrointestinal: Negative.    Endocrine: Negative.    Genitourinary: Negative.    Breast: Negative.   Musculoskeletal: Negative.    Skin: Negative.    Neurological: Negative.    Psychiatric/Behavioral: Negative.    All other systems reviewed and are negative.       Objective:  Visit Vitals  /72 (BP Location: Left upper arm, Patient Position: Sitting)   Ht 1.651 m (5' 5\")   Wt 68 kg (150 lb)   LMP 06/19/2023 (Approximate)   BMI 24.96 kg/m²        Physical Exam:  Physical Exam  Constitutional:       Appearance: Normal appearance. She is normal weight.   HENT:      Head: Normocephalic and atraumatic.      Nose: Nose normal.     Eyes:      Extraocular Movements: Extraocular movements intact.      Conjunctiva/sclera: Conjunctivae normal.      Pupils: Pupils are equal, round, and reactive to light.   Genitourinary:    Urethra, bladder, vagina, cervix, uterus, urethral meatus, external female genitalia and adnexa normal.   Pelvic exam was performed with patient in lithotomy exam position.   Uterus is mobile. Uterus is not enlarged or tender. Uterine contour is regular.   Cardiovascular:      Rate and Rhythm: Normal rate and regular rhythm.   Pulmonary:      Effort: Pulmonary effort is normal.   Chest:   Breasts:     Right: Normal. No swelling, inverted nipple, mass, skin change or tenderness.      Left: Normal. No swelling, inverted nipple, mass, skin change or tenderness.   Abdominal:      General: Abdomen is " flat.      Palpations: Abdomen is soft. There is no mass.      Tenderness: There is no abdominal tenderness. There is no guarding or rebound.   Musculoskeletal:         General: Normal range of motion.      Cervical back: Normal range of motion.   Lymphadenopathy:      Upper Body:      Right upper body: No axillary adenopathy.      Left upper body: No axillary adenopathy.   Neurological:      General: No focal deficit present.      Mental Status: She is alert and oriented to person, place, and time.   Skin:     General: Skin is warm and dry.   Psychiatric:         Mood and Affect: Mood normal.         Behavior: Behavior normal.         Thought Content: Thought content normal.         Judgment: Judgment normal.   Vitals reviewed.           Assessment and Plan:   Annual: Pap done today, periods reviewed  Self breast exam taught  Screening mammogram n/a  Contraceptive plan: declines  STI screening: gc/ct/trich w pap and serum labs given  Screening Colonoscopy at 45  Follows routine care with PCP   The following counseling was provided: Diet and Exercise: Smoking Cessation; Drug/Alcohol Abuse/ HIV and Safe Sex/ Domestic Violence/ Vitamin Supplementation    Diagnoses and all orders for this visit:    Well woman exam with routine gynecological exam    Screening for cervical cancer  -     Cytology, Thinprep Pap    Encounter for other general counseling or advice on contraception    Encounter for breast cancer screening using non-mammogram modality    Screening examination for sexually transmitted disease  -     Cytology, Thinprep Pap  -     HIV 1,2 AB P24 AG; Future  -     Syphilis Antibodies; Future  -     Hepatitis B surface antigen; Future  -     Hepatitis C antibody; Future    .  All questions answered.  Await pap smear results..        Fallon Bernal MD

## 2023-07-10 ENCOUNTER — OFFICE VISIT (OUTPATIENT)
Dept: OBSTETRICS AND GYNECOLOGY | Facility: CLINIC | Age: 23
End: 2023-07-10
Payer: COMMERCIAL

## 2023-07-10 VITALS
BODY MASS INDEX: 24.99 KG/M2 | DIASTOLIC BLOOD PRESSURE: 72 MMHG | HEIGHT: 65 IN | WEIGHT: 150 LBS | SYSTOLIC BLOOD PRESSURE: 120 MMHG

## 2023-07-10 DIAGNOSIS — Z30.09 ENCOUNTER FOR OTHER GENERAL COUNSELING OR ADVICE ON CONTRACEPTION: ICD-10-CM

## 2023-07-10 DIAGNOSIS — Z12.39 ENCOUNTER FOR BREAST CANCER SCREENING USING NON-MAMMOGRAM MODALITY: ICD-10-CM

## 2023-07-10 DIAGNOSIS — Z12.4 SCREENING FOR CERVICAL CANCER: ICD-10-CM

## 2023-07-10 DIAGNOSIS — Z11.3 SCREENING EXAMINATION FOR SEXUALLY TRANSMITTED DISEASE: ICD-10-CM

## 2023-07-10 DIAGNOSIS — Z01.419 WELL WOMAN EXAM WITH ROUTINE GYNECOLOGICAL EXAM: Primary | ICD-10-CM

## 2023-07-10 PROCEDURE — 3008F BODY MASS INDEX DOCD: CPT | Performed by: OBSTETRICS & GYNECOLOGY

## 2023-07-10 PROCEDURE — S0610 ANNUAL GYNECOLOGICAL EXAMINA: HCPCS | Performed by: OBSTETRICS & GYNECOLOGY

## 2023-07-10 ASSESSMENT — ENCOUNTER SYMPTOMS
CARDIOVASCULAR NEGATIVE: 1
MUSCULOSKELETAL NEGATIVE: 1
ENDOCRINE NEGATIVE: 1
RESPIRATORY NEGATIVE: 1
PSYCHIATRIC NEGATIVE: 1
CONSTITUTIONAL NEGATIVE: 1
GASTROINTESTINAL NEGATIVE: 1
NEUROLOGICAL NEGATIVE: 1

## 2023-07-12 LAB
C TRACH RRNA CVX QL NAA+PROBE: NEGATIVE
CYTOLOGIST CVX/VAG CYTO: NORMAL
CYTOLOGY CVX/VAG DOC CYTO: NORMAL
CYTOLOGY CVX/VAG DOC THIN PREP: NORMAL
DX ICD CODE: NORMAL
HPV I/H RISK 4 DNA CVX QL PROBE+SIG AMP: NEGATIVE
LAB CORP NOTE:: NORMAL
LC HPV GENOTYPE REFLEX: NORMAL
N GONORRHOEA RRNA CVX QL NAA+PROBE: NEGATIVE
OTHER STN SPEC: NORMAL
STAT OF ADQ CVX/VAG CYTO-IMP: NORMAL
T VAGINALIS RRNA SPEC QL NAA+PROBE: NEGATIVE

## 2023-12-01 ENCOUNTER — TELEPHONE (OUTPATIENT)
Dept: INTERNAL MEDICINE | Facility: CLINIC | Age: 23
End: 2023-12-01
Payer: COMMERCIAL

## 2023-12-01 LAB
ALBUMIN SERPL-MCNC: 4.7 G/DL (ref 4–5)
ALBUMIN/GLOB SERPL: 2.1 {RATIO} (ref 1.2–2.2)
ALP SERPL-CCNC: 31 IU/L (ref 44–121)
ALT SERPL-CCNC: 14 IU/L (ref 0–32)
AST SERPL-CCNC: 18 IU/L (ref 0–40)
BASOPHILS # BLD AUTO: 0 X10E3/UL (ref 0–0.2)
BASOPHILS NFR BLD AUTO: 1 %
BILIRUB SERPL-MCNC: 0.4 MG/DL (ref 0–1.2)
BUN SERPL-MCNC: 12 MG/DL (ref 6–20)
BUN/CREAT SERPL: 16 (ref 9–23)
CALCIUM SERPL-MCNC: 9.2 MG/DL (ref 8.7–10.2)
CHLORIDE SERPL-SCNC: 105 MMOL/L (ref 96–106)
CO2 SERPL-SCNC: 24 MMOL/L (ref 20–29)
CREAT SERPL-MCNC: 0.75 MG/DL (ref 0.57–1)
EGFRCR SERPLBLD CKD-EPI 2021: 113 ML/MIN/1.73
EOSINOPHIL # BLD AUTO: 0.1 X10E3/UL (ref 0–0.4)
EOSINOPHIL NFR BLD AUTO: 1 %
ERYTHROCYTE [DISTWIDTH] IN BLOOD BY AUTOMATED COUNT: 12.2 % (ref 11.7–15.4)
GLOBULIN SER CALC-MCNC: 2.2 G/DL (ref 1.5–4.5)
GLUCOSE SERPL-MCNC: 94 MG/DL (ref 70–99)
HBV SURFACE AG SERPL QL IA: NEGATIVE
HCT VFR BLD AUTO: 36.2 % (ref 34–46.6)
HCV IGG SERPL QL IA: NON REACTIVE
HGB BLD-MCNC: 12.2 G/DL (ref 11.1–15.9)
HIV 1+2 AB+HIV1 P24 AG SERPL QL IA: NON REACTIVE
IMM GRANULOCYTES # BLD AUTO: 0 X10E3/UL (ref 0–0.1)
IMM GRANULOCYTES NFR BLD AUTO: 0 %
LYMPHOCYTES # BLD AUTO: 1.9 X10E3/UL (ref 0.7–3.1)
LYMPHOCYTES NFR BLD AUTO: 43 %
MCH RBC QN AUTO: 29.9 PG (ref 26.6–33)
MCHC RBC AUTO-ENTMCNC: 33.7 G/DL (ref 31.5–35.7)
MCV RBC AUTO: 89 FL (ref 79–97)
MONOCYTES # BLD AUTO: 0.3 X10E3/UL (ref 0.1–0.9)
MONOCYTES NFR BLD AUTO: 7 %
NEUTROPHILS # BLD AUTO: 2.1 X10E3/UL (ref 1.4–7)
NEUTROPHILS NFR BLD AUTO: 48 %
PLATELET # BLD AUTO: 210 X10E3/UL (ref 150–450)
POTASSIUM SERPL-SCNC: 4.1 MMOL/L (ref 3.5–5.2)
PROT SERPL-MCNC: 6.9 G/DL (ref 6–8.5)
RBC # BLD AUTO: 4.08 X10E6/UL (ref 3.77–5.28)
SODIUM SERPL-SCNC: 141 MMOL/L (ref 134–144)
TREPONEMA PALLIDUM IGG+IGM AB [PRESENCE] IN SERUM OR PLASMA BY IMMUNOASSAY: NON REACTIVE
WBC # BLD AUTO: 4.5 X10E3/UL (ref 3.4–10.8)

## 2023-12-01 NOTE — TELEPHONE ENCOUNTER
----- Message from LENCHO Chen sent at 12/1/2023  8:08 AM EST -----  Liver enzymes have normalized-please have her pick new pcp and schedule visit if staying with practice

## 2023-12-19 ENCOUNTER — OFFICE VISIT (OUTPATIENT)
Dept: INTERNAL MEDICINE | Facility: CLINIC | Age: 23
End: 2023-12-19
Payer: COMMERCIAL

## 2023-12-19 VITALS
HEIGHT: 65 IN | BODY MASS INDEX: 25.16 KG/M2 | TEMPERATURE: 98.7 F | DIASTOLIC BLOOD PRESSURE: 56 MMHG | WEIGHT: 151 LBS | HEART RATE: 61 BPM | RESPIRATION RATE: 16 BRPM | SYSTOLIC BLOOD PRESSURE: 100 MMHG | OXYGEN SATURATION: 97 %

## 2023-12-19 DIAGNOSIS — M30.3 ATYPICAL KAWASAKI DISEASE (CMS/HCC): ICD-10-CM

## 2023-12-19 DIAGNOSIS — R53.82 CHRONIC FATIGUE: ICD-10-CM

## 2023-12-19 DIAGNOSIS — R51.9 NONINTRACTABLE HEADACHE, UNSPECIFIED CHRONICITY PATTERN, UNSPECIFIED HEADACHE TYPE: ICD-10-CM

## 2023-12-19 DIAGNOSIS — E55.9 VITAMIN D DEFICIENCY: ICD-10-CM

## 2023-12-19 DIAGNOSIS — Z13.220 SCREENING FOR HYPERLIPIDEMIA: ICD-10-CM

## 2023-12-19 DIAGNOSIS — Z13.29 SCREENING FOR THYROID DISORDER: ICD-10-CM

## 2023-12-19 DIAGNOSIS — J32.9 RECURRENT SINUS INFECTIONS: ICD-10-CM

## 2023-12-19 DIAGNOSIS — Z00.01 ENCOUNTER FOR ROUTINE ADULT HEALTH EXAMINATION WITH ABNORMAL FINDINGS: Primary | ICD-10-CM

## 2023-12-19 PROCEDURE — 3008F BODY MASS INDEX DOCD: CPT | Performed by: INTERNAL MEDICINE

## 2023-12-19 PROCEDURE — 99395 PREV VISIT EST AGE 18-39: CPT | Performed by: INTERNAL MEDICINE

## 2023-12-19 ASSESSMENT — PAIN SCALES - GENERAL: PAINLEVEL: 0-NO PAIN

## 2023-12-19 NOTE — PROGRESS NOTES
[facility]    Subjective:  Sirena Shin is a 23 y.o. female presenting with the chief complaint of:   Chief Complaint   Patient presents with   • Annual Exam       Chronic medical conditions include:  Patient Active Problem List   Diagnosis   • Intractable chronic migraine without aura and with status migrainosus   • Routine adult health maintenance   • Immunity status testing   • Atypical Kawasaki disease (CMS/HCC)   • Screening for cervical cancer   • Family history of elevated blood lipids   • Family history of kidney disease in mother   • Need for hepatitis C screening test   • Screening for HIV (human immunodeficiency virus)   • Acute bacterial tonsillitis   • Mononucleosis   • Elevated liver function tests   • Verrucae vulgaris   • Vitamin D deficiency   • Elevated LDL cholesterol level       HPI:  Pt presents for annual exam  Last year she had mono and she did eventually recover. If she is busy at work, she may be depleted energy-wise. She will need more naps. If she doesn't sleep enough, she gets worn out easily.     She had some repeat BW a few weeks ago. LFTs normalized.     She has had a lot of URIs this past year. More sinus symptoms than cough/lung issues. She has been prone to sinusitis. Saw ENT last spring and was given a script for a CT of her sinuses. She had some BW ordered for allergies. She reports allergy testing negative previously.     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     She is taking vitamin C, zinc, 8 hours of sleep.     She eats a varied diet  She gets regular periods and they are normal but can be heavy every 3 cycles. Can have bad cramps x 1-2 days and then it subsides.     At the end of college she started w/ weekly migraines which was new for her. She had gone on a hike and found a tick hours later and removed it. She got tested for Lyme a few times because she developed headaches and fatigue. She ended up going to [place] and  "was told this is not Lyme.     Headaches are better. She did end up seeing a neurologist. She was placed on nortriptyline and felt poorly on it due to side effects and wasn't effective for her headaches. She now has HA once a month. Might be during PMS time but sometimes not. She takes ibuprofen and reduces screen time and sleep more. HA will go away in 3 days if it comes on. She sees a chiropractor for chronic neck / shoulder pain. She didn't like the triptans. Excedrin works for her.     She was on OCP in college and didn't like being on it.     Declines flu/covid vaccine today    She has been working out every day.     Murmur  Hx Kawasaki as a child. Reports regular cardio f/u for years and now supposed to see cardiology q 5 years.  Due for cardiology f/u    The following portions of the patient's history were reviewed and updated as appropriate: allergies, current medications, family history, past medical history, social history, surgical history, and problem list.     No current outpatient medications    Review of Systems  As noted in HPI and otherwise negative    Objective:    Vitals:    12/19/23 1533   BP: (!) 100/56   BP Location: Right upper arm   Patient Position: Sitting   Pulse: 61   Resp: 16   Temp: 37.1 °C (98.7 °F)   TempSrc: Temporal   SpO2: 97%   Weight: 68.5 kg (151 lb)   Height: 1.651 m (5' 5\")     Wt Readings from Last 3 Encounters:   12/19/23 68.5 kg (151 lb)   07/10/23 68 kg (150 lb)   05/30/23 68.5 kg (151 lb)       Physical Exam  Vitals reviewed.   Constitutional:       Appearance: Normal appearance.   HENT:      Head: Normocephalic and atraumatic.      Right Ear: Tympanic membrane, ear canal and external ear normal. There is no impacted cerumen.      Left Ear: Tympanic membrane, ear canal and external ear normal. There is no impacted cerumen.   Eyes:      Conjunctiva/sclera: Conjunctivae normal.      Pupils: Pupils are equal, round, and reactive to light.   Neck:      Thyroid: No thyroid " mass, thyromegaly or thyroid tenderness.   Cardiovascular:      Rate and Rhythm: Normal rate and regular rhythm.      Heart sounds: Murmur heard.   Pulmonary:      Effort: Pulmonary effort is normal. No respiratory distress.      Breath sounds: No wheezing, rhonchi or rales.   Abdominal:      General: Abdomen is flat. Bowel sounds are normal. There is no distension.      Tenderness: There is no abdominal tenderness. There is no guarding.   Musculoskeletal:      Right lower leg: No edema.      Left lower leg: No edema.   Lymphadenopathy:      Cervical: No cervical adenopathy.   Skin:     General: Skin is warm and dry.   Neurological:      General: No focal deficit present.      Mental Status: She is alert and oriented to person, place, and time.   Psychiatric:         Mood and Affect: Mood normal.         Behavior: Behavior normal.         Thought Content: Thought content normal.         Judgment: Judgment normal.         Assessment & Plan:    Sirena was seen today for annual exam.    Diagnoses and all orders for this visit:    Encounter for routine adult health examination with abnormal findings  -Encouraged regular exercise and healthy diet.  -Encouraged regular eye and dental appointments.  -Health maintenance updated/addressed as noted in HPI and below  -Routine vaccinations recommended     Recurrent sinus infections  Comments:  check IgA  Orders:  -     Immunoglobulins, IgG, IgA, IgM; Future  -     Immunoglobulins, IgG, IgA, IgM    Chronic fatigue  Comments:  evaluate for cause w/ labs  Orders:  -     Iron and TIBC; Future  -     Ferritin; Future  -     Transferrin; Future  -     Vitamin B12; Future  -     Iron and TIBC  -     Ferritin  -     Transferrin  -     Vitamin B12    Nonintractable headache, unspecified chronicity pattern, unspecified headache type  Comments:  HA now once a month  didn't tolerate pamelor/triptan  cont PRN excedrin, chiropractic    Atypical Kawasaki disease (CMS/HCC)  Comments:  due for  cardio f/u    Vitamin D deficiency  -     Vitamin D 25 hydroxy; Future  -     Vitamin D 25 hydroxy    Screening for thyroid disorder  -     TSH 3rd Generation; Future  -     T4, free; Future  -     TSH 3rd Generation  -     T4, free    Screening for hyperlipidemia  -     Lipid panel; Future  -     Lipid panel        Return in about 1 year (around 12/19/2024) for Annual Physical Exam - 20.    Health Maintenance   Topic Date Due   • Influenza Vaccine (1) 08/01/2023   • COVID-19 Vaccine (3 - 2023-24 season) 09/01/2023   • Depression Screening  05/02/2024   • Cervical Cancer Screening  07/10/2026   • DTaP, Tdap, and Td Vaccines (8 - Td or Tdap) 07/20/2032   • Zoster Vaccine (1 of 2) 09/05/2048   • Meningococcal ACWY  Completed   • HIB Vaccines  Completed   • Hepatitis B Vaccines  Completed   • IPV Vaccines  Completed   • HPV Vaccines  Completed   • HIV Screening  Completed   • Hepatitis C Screening  Completed   • Pneumococcal  Aged Out       Blanche Hernandez,   12/26/23

## 2023-12-19 NOTE — PATIENT INSTRUCTIONS
For cardiology, please contact one of the groups below:    Dr. Migdalia Long, Dr. Payne at [facility] [phone]

## 2023-12-22 PROBLEM — E55.9 VITAMIN D DEFICIENCY: Status: ACTIVE | Noted: 2023-12-22

## 2023-12-22 PROBLEM — E78.00 ELEVATED LDL CHOLESTEROL LEVEL: Status: ACTIVE | Noted: 2023-12-22

## 2023-12-22 LAB
25(OH)D3+25(OH)D2 SERPL-MCNC: 17.2 NG/ML (ref 30–100)
CHOLEST SERPL-MCNC: 220 MG/DL (ref 100–199)
FERRITIN SERPL-MCNC: 21 NG/ML (ref 15–150)
HDLC SERPL-MCNC: 74 MG/DL
IGA SERPL-MCNC: 88 MG/DL (ref 87–352)
IGG SERPL-MCNC: 957 MG/DL (ref 586–1602)
IGM SERPL-MCNC: 169 MG/DL (ref 26–217)
IRON SATN MFR SERPL: 28 % (ref 15–55)
IRON SERPL-MCNC: 105 UG/DL (ref 27–159)
LDLC SERPL CALC-MCNC: 135 MG/DL (ref 0–99)
T4 FREE SERPL-MCNC: 1.27 NG/DL (ref 0.82–1.77)
TIBC SERPL-MCNC: 373 UG/DL (ref 250–450)
TRANSFERRIN SERPL-MCNC: 309 MG/DL (ref 192–364)
TRIGL SERPL-MCNC: 65 MG/DL (ref 0–149)
TSH SERPL DL<=0.005 MIU/L-ACNC: 2.05 UIU/ML (ref 0.45–4.5)
UIBC SERPL-MCNC: 268 UG/DL (ref 131–425)
VIT B12 SERPL-MCNC: 345 PG/ML (ref 232–1245)
VLDLC SERPL CALC-MCNC: 11 MG/DL (ref 5–40)

## 2024-09-10 ENCOUNTER — OFFICE VISIT (OUTPATIENT)
Dept: OBSTETRICS AND GYNECOLOGY | Facility: CLINIC | Age: 24
End: 2024-09-10
Payer: COMMERCIAL

## 2024-09-10 VITALS
BODY MASS INDEX: 24.16 KG/M2 | DIASTOLIC BLOOD PRESSURE: 80 MMHG | HEIGHT: 65 IN | SYSTOLIC BLOOD PRESSURE: 100 MMHG | WEIGHT: 145 LBS

## 2024-09-10 DIAGNOSIS — Z01.419 WELL WOMAN EXAM WITH ROUTINE GYNECOLOGICAL EXAM: Primary | ICD-10-CM

## 2024-09-10 DIAGNOSIS — Z11.3 SCREENING EXAMINATION FOR SEXUALLY TRANSMITTED DISEASE: ICD-10-CM

## 2024-09-10 DIAGNOSIS — Z30.09 ENCOUNTER FOR OTHER GENERAL COUNSELING OR ADVICE ON CONTRACEPTION: ICD-10-CM

## 2024-09-10 DIAGNOSIS — Z12.39 ENCOUNTER FOR BREAST CANCER SCREENING USING NON-MAMMOGRAM MODALITY: ICD-10-CM

## 2024-09-10 PROCEDURE — S0612 ANNUAL GYNECOLOGICAL EXAMINA: HCPCS | Performed by: OBSTETRICS & GYNECOLOGY

## 2024-09-10 PROCEDURE — 994XX PELVIC EXAM - NON-BILLABLE: CPT | Performed by: OBSTETRICS & GYNECOLOGY

## 2024-09-10 ASSESSMENT — ENCOUNTER SYMPTOMS
NEUROLOGICAL NEGATIVE: 1
CONSTITUTIONAL NEGATIVE: 1
ENDOCRINE NEGATIVE: 1
GASTROINTESTINAL NEGATIVE: 1
MUSCULOSKELETAL NEGATIVE: 1
CARDIOVASCULAR NEGATIVE: 1
PSYCHIATRIC NEGATIVE: 1
RESPIRATORY NEGATIVE: 1

## 2024-09-10 NOTE — PROGRESS NOTES
Subjective:   9/10/2024  Sirena Shin is a 24 y.o. G0 female who presents for annual exam.       LMP:9/3/24  Periods are regular every 28-30 days, lasting 5 days. Dysmenorrhea:none. No intermenstrual bleeding, spotting, or discharge.    Notices that right before her period, gets severe depression, sleepy and does not want to get out of bed. As soon as gets period, symptoms resolve. Discussed likely 2/2 cycle and can try ocps to see if improve symptoms. Will think about it and let me know    The patient is sexually active with women , monogamous  Problems with sexual activity denies      Current contraception: none  H/o fibroids/ovarian cysts: ddenies  History of STDs: denies  Family history of uterine or ovarian cancer: no  Family history of breast cancer: no  Regular self breast exam: yes    Pap smear: 7/10/23 neg/neg  HPV vaccine status: yes    Urinary complaints denies  GI complaints denies  Pt does exercise      Occupation: , went to [employer]        Past Medical History:   Diagnosis Date    Atypical Kawasaki disease (CMS/[facility]) 2002    Mononucleosis      Past Surgical History:   Procedure Laterality Date    ANTERIOR CRUCIATE LIGAMENT REPAIR Right 2016    ANTERIOR CRUCIATE LIGAMENT REPAIR  2015    WISDOM TOOTH EXTRACTION Bilateral 2015     No current outpatient medications on file.  No Known Allergies  Social History     Socioeconomic History    Marital status: Single     Spouse name: None    Number of children: None    Years of education: None    Highest education level: None   Tobacco Use    Smoking status: Never    Smokeless tobacco: Never   Vaping Use    Vaping Use: Never used   Substance and Sexual Activity    Alcohol use: Yes     Alcohol/week: 5.0 standard drinks of alcohol     Types: 2 Glasses of wine, 2 Cans of beer, 1 Shots of liquor per week    Drug use: Never    Sexual activity: Yes     Partners: Female, Male     Birth control/protection: Condom   Social History Narrative     at a  "construction company -- full time    [employer]     Exercise - gym 5 x a week / walking     Social Determinants of Health     Food Insecurity: No Food Insecurity (5/5/2023)    Hunger Vital Sign     Worried About Running Out of Food in the Last Year: Never true     Ran Out of Food in the Last Year: Never true      Family History   Problem Relation Age of Onset    Kidney disease Biological Mother         MPGN    Hyperlipidemia Biological Mother     Hyperlipidemia Biological Father     Anti-cardiolipin syndrome Maternal Grandfather     Early death Paternal Grandfather     Heart disease Paternal Grandfather         sudden death        Review of Systems   Constitutional: Negative.    HENT: Negative.     Respiratory: Negative.     Cardiovascular: Negative.    Gastrointestinal: Negative.    Endocrine: Negative.    Genitourinary: Negative.    Breast: Negative.   Musculoskeletal: Negative.    Skin: Negative.    Neurological: Negative.    Psychiatric/Behavioral: Negative.     All other systems reviewed and are negative.       Objective:  Visit Vitals  /80 (BP Location: Left upper arm, Patient Position: Sitting)   Ht 1.651 m (5' 5\")   Wt 65.8 kg (145 lb)   LMP 09/03/2024   BMI 24.13 kg/m²          Physical Exam:  Physical Exam  Constitutional:       Appearance: Normal appearance. She is normal weight.   HENT:      Head: Normocephalic and atraumatic.      Nose: Nose normal.     Eyes:      Extraocular Movements: Extraocular movements intact.      Conjunctiva/sclera: Conjunctivae normal.      Pupils: Pupils are equal, round, and reactive to light.   Genitourinary:    Urethra, bladder, vagina, cervix, uterus, urethral meatus, external female genitalia and adnexa normal.   Pelvic exam was performed with patient in lithotomy exam position.   Pelvic exam conducted with chaperone present  Uterus is mobile. Uterus is not enlarged or tender. Uterine contour is regular.    Genitourinary Comments: Exam was done with two lubricated and " gloved fingers inside the vagina to palpate the cervix, uterus, and ovaries while using the other hand to gently palpate the top of the uterus with pressure on the abdomen, noting the organs' size, shape, and/or any abnormality.     Neck:      Thyroid: No thyroid mass, thyromegaly or thyroid tenderness.   Cardiovascular:      Rate and Rhythm: Normal rate and regular rhythm.   Pulmonary:      Effort: Pulmonary effort is normal.   Chest:   Breasts:     Right: Normal. No swelling, inverted nipple, mass, skin change or tenderness.      Left: Normal. No swelling, inverted nipple, mass, skin change or tenderness.       Abdominal:      General: Abdomen is flat.      Palpations: Abdomen is soft. There is no mass.      Tenderness: There is no abdominal tenderness. There is no guarding or rebound.   Musculoskeletal:         General: Normal range of motion.      Cervical back: Normal range of motion.   Lymphadenopathy:      Upper Body:      Right upper body: No axillary adenopathy.      Left upper body: No axillary adenopathy.   Neurological:      General: No focal deficit present.      Mental Status: She is alert and oriented to person, place, and time.   Skin:     General: Skin is warm and dry.   Psychiatric:         Mood and Affect: Mood normal.         Behavior: Behavior normal.         Thought Content: Thought content normal.         Judgment: Judgment normal.   Vitals reviewed.           Assessment and Plan:   Annual: Pap up to date, periods reviewed  Self breast exam taught  Screening mammogram n/a  Contraceptive plan: declines  STI screening: declines  Screening Colonoscopy at 43  Follows routine care with PCP   The following counseling was provided: Diet and Exercise: Smoking Cessation; Drug/Alcohol Abuse/ HIV and Safe Sex/ Domestic Violence/ Vitamin Supplementation    Diagnoses and all orders for this visit:    Well woman exam with routine gynecological exam    Encounter for other general counseling or advice on  contraception    Encounter for breast cancer screening using non-mammogram modality    Screening examination for sexually transmitted disease      .  All questions answered.  Await pap smear results..        Fallon Bernal MD

## 2024-09-10 NOTE — LETTER
September 10, 2024     Blanche Hernandez,   [address]  [facility] [place] [address]    Patient: Sirena Shin  YOB: 2000  Date of Visit: 9/10/2024      Dear Dr. Hernandez:    Thank you for referring Sirena Shin to me for evaluation. Below are my notes for this consultation.    If you have questions, please do not hesitate to call me. I look forward to following your patient along with you.         Sincerely,        Fallon Bernal MD        CC: No Recipients    Fallon Bernal MD  9/10/2024  4:16 PM  Sign when Signing Visit  Subjective:   9/10/2024  Sirena Shin is a 24 y.o. G0 female who presents for annual exam.       LMP:9/3/24  Periods are regular every 28-30 days, lasting 5 days. Dysmenorrhea:none. No intermenstrual bleeding, spotting, or discharge.    Notices that right before her period, gets severe depression, sleepy and does not want to get out of bed. As soon as gets period, symptoms resolve. Discussed likely 2/2 cycle and can try ocps to see if improve symptoms. Will think about it and let me know    The patient is sexually active with women , monogamous  Problems with sexual activity denies      Current contraception: none  H/o fibroids/ovarian cysts: ddenies  History of STDs: denies  Family history of uterine or ovarian cancer: no  Family history of breast cancer: no  Regular self breast exam: yes    Pap smear: 7/10/23 neg/neg  HPV vaccine status: yes    Urinary complaints denies  GI complaints denies  Pt does exercise      Occupation: , went to [employer]        Past Medical History:   Diagnosis Date   • Atypical Kawasaki disease (CMS/HCC) 2002   • Mononucleosis      Past Surgical History:   Procedure Laterality Date   • ANTERIOR CRUCIATE LIGAMENT REPAIR Right 2016   • ANTERIOR CRUCIATE LIGAMENT REPAIR  2015   • WISDOM TOOTH EXTRACTION Bilateral 2015     No current outpatient medications on file.  No Known Allergies  Social History     Socioeconomic History  "  • Marital status: Single     Spouse name: None   • Number of children: None   • Years of education: None   • Highest education level: None   Tobacco Use   • Smoking status: Never   • Smokeless tobacco: Never   Vaping Use   • Vaping Use: Never used   Substance and Sexual Activity   • Alcohol use: Yes     Alcohol/week: 5.0 standard drinks of alcohol     Types: 2 Glasses of wine, 2 Cans of beer, 1 Shots of liquor per week   • Drug use: Never   • Sexual activity: Yes     Partners: Female, Male     Birth control/protection: Condom   Social History Narrative     at a construction company -- full time    [employer]     Exercise - gym 5 x a week / walking     Social Determinants of Health     Food Insecurity: No Food Insecurity (5/5/2023)    Hunger Vital Sign    • Worried About Running Out of Food in the Last Year: Never true    • Ran Out of Food in the Last Year: Never true      Family History   Problem Relation Age of Onset   • Kidney disease Biological Mother         MPGN   • Hyperlipidemia Biological Mother    • Hyperlipidemia Biological Father    • Anti-cardiolipin syndrome Maternal Grandfather    • Early death Paternal Grandfather    • Heart disease Paternal Grandfather         sudden death        Review of Systems   Constitutional: Negative.    HENT: Negative.     Respiratory: Negative.     Cardiovascular: Negative.    Gastrointestinal: Negative.    Endocrine: Negative.    Genitourinary: Negative.    Breast: Negative.   Musculoskeletal: Negative.    Skin: Negative.    Neurological: Negative.    Psychiatric/Behavioral: Negative.     All other systems reviewed and are negative.       Objective:  Visit Vitals  /80 (BP Location: Left upper arm, Patient Position: Sitting)   Ht 1.651 m (5' 5\")   Wt 65.8 kg (145 lb)   LMP 09/03/2024   BMI 24.13 kg/m²          Physical Exam:  Physical Exam  Constitutional:       Appearance: Normal appearance. She is normal weight.   HENT:      Head: Normocephalic and atraumatic. "      Nose: Nose normal.     Eyes:      Extraocular Movements: Extraocular movements intact.      Conjunctiva/sclera: Conjunctivae normal.      Pupils: Pupils are equal, round, and reactive to light.   Genitourinary:    Urethra, bladder, vagina, cervix, uterus, urethral meatus, external female genitalia and adnexa normal.   Pelvic exam was performed with patient in lithotomy exam position.   Pelvic exam conducted with chaperone present  Uterus is mobile. Uterus is not enlarged or tender. Uterine contour is regular.    Genitourinary Comments: Exam was done with two lubricated and gloved fingers inside the vagina to palpate the cervix, uterus, and ovaries while using the other hand to gently palpate the top of the uterus with pressure on the abdomen, noting the organs' size, shape, and/or any abnormality.     Neck:      Thyroid: No thyroid mass, thyromegaly or thyroid tenderness.   Cardiovascular:      Rate and Rhythm: Normal rate and regular rhythm.   Pulmonary:      Effort: Pulmonary effort is normal.   Chest:   Breasts:     Right: Normal. No swelling, inverted nipple, mass, skin change or tenderness.      Left: Normal. No swelling, inverted nipple, mass, skin change or tenderness.       Abdominal:      General: Abdomen is flat.      Palpations: Abdomen is soft. There is no mass.      Tenderness: There is no abdominal tenderness. There is no guarding or rebound.   Musculoskeletal:         General: Normal range of motion.      Cervical back: Normal range of motion.   Lymphadenopathy:      Upper Body:      Right upper body: No axillary adenopathy.      Left upper body: No axillary adenopathy.   Neurological:      General: No focal deficit present.      Mental Status: She is alert and oriented to person, place, and time.   Skin:     General: Skin is warm and dry.   Psychiatric:         Mood and Affect: Mood normal.         Behavior: Behavior normal.         Thought Content: Thought content normal.         Judgment:  Judgment normal.   Vitals reviewed.           Assessment and Plan:   Annual: Pap up to date, periods reviewed  Self breast exam taught  Screening mammogram n/a  Contraceptive plan: declines  STI screening: declines  Screening Colonoscopy at 43  Follows routine care with PCP   The following counseling was provided: Diet and Exercise: Smoking Cessation; Drug/Alcohol Abuse/ HIV and Safe Sex/ Domestic Violence/ Vitamin Supplementation    Diagnoses and all orders for this visit:    Well woman exam with routine gynecological exam    Encounter for other general counseling or advice on contraception    Encounter for breast cancer screening using non-mammogram modality    Screening examination for sexually transmitted disease      .  All questions answered.  Await pap smear results..        Fallon Bernal MD

## 2025-07-21 ENCOUNTER — OFFICE VISIT (OUTPATIENT)
Dept: INTERNAL MEDICINE | Facility: CLINIC | Age: 25
End: 2025-07-21
Payer: COMMERCIAL

## 2025-07-21 VITALS
DIASTOLIC BLOOD PRESSURE: 70 MMHG | RESPIRATION RATE: 18 BRPM | HEART RATE: 74 BPM | BODY MASS INDEX: 24.16 KG/M2 | HEIGHT: 65 IN | OXYGEN SATURATION: 98 % | SYSTOLIC BLOOD PRESSURE: 112 MMHG | WEIGHT: 145 LBS | TEMPERATURE: 98.1 F

## 2025-07-21 DIAGNOSIS — G44.209 TENSION HEADACHE: ICD-10-CM

## 2025-07-21 DIAGNOSIS — Z13.1 SCREENING FOR DIABETES MELLITUS: ICD-10-CM

## 2025-07-21 DIAGNOSIS — E55.9 VITAMIN D DEFICIENCY: ICD-10-CM

## 2025-07-21 DIAGNOSIS — S83.203S OTHER TEAR OF MENISCUS OF RIGHT KNEE, UNSPECIFIED MENISCUS, UNSPECIFIED WHETHER OLD OR CURRENT TEAR, SEQUELA: ICD-10-CM

## 2025-07-21 DIAGNOSIS — Z13.29 SCREENING FOR THYROID DISORDER: ICD-10-CM

## 2025-07-21 DIAGNOSIS — Z00.01 ENCOUNTER FOR ROUTINE ADULT HEALTH EXAMINATION WITH ABNORMAL FINDINGS: Primary | ICD-10-CM

## 2025-07-21 DIAGNOSIS — F41.9 ANXIETY: ICD-10-CM

## 2025-07-21 DIAGNOSIS — M30.3 ATYPICAL KAWASAKI DISEASE (CMS/HCC): ICD-10-CM

## 2025-07-21 DIAGNOSIS — E78.00 ELEVATED LDL CHOLESTEROL LEVEL: ICD-10-CM

## 2025-07-21 PROBLEM — R79.89 ELEVATED LIVER FUNCTION TESTS: Status: RESOLVED | Noted: 2023-05-30 | Resolved: 2025-07-21

## 2025-07-21 PROBLEM — Z00.00 ROUTINE ADULT HEALTH MAINTENANCE: Status: RESOLVED | Noted: 2022-07-20 | Resolved: 2025-07-21

## 2025-07-21 PROBLEM — Z11.4 SCREENING FOR HIV (HUMAN IMMUNODEFICIENCY VIRUS): Status: RESOLVED | Noted: 2022-07-20 | Resolved: 2025-07-21

## 2025-07-21 PROBLEM — B27.90 MONONUCLEOSIS: Status: RESOLVED | Noted: 2023-05-30 | Resolved: 2025-07-21

## 2025-07-21 PROBLEM — Z01.84 IMMUNITY STATUS TESTING: Status: RESOLVED | Noted: 2022-07-20 | Resolved: 2025-07-21

## 2025-07-21 PROBLEM — B96.89 ACUTE BACTERIAL TONSILLITIS: Status: RESOLVED | Noted: 2023-05-02 | Resolved: 2025-07-21

## 2025-07-21 PROBLEM — Z11.59 NEED FOR HEPATITIS C SCREENING TEST: Status: RESOLVED | Noted: 2022-07-20 | Resolved: 2025-07-21

## 2025-07-21 PROBLEM — J03.80 ACUTE BACTERIAL TONSILLITIS: Status: RESOLVED | Noted: 2023-05-02 | Resolved: 2025-07-21

## 2025-07-21 PROCEDURE — 3008F BODY MASS INDEX DOCD: CPT | Performed by: INTERNAL MEDICINE

## 2025-07-21 PROCEDURE — 99395 PREV VISIT EST AGE 18-39: CPT | Performed by: INTERNAL MEDICINE

## 2025-07-21 RX ORDER — ESCITALOPRAM OXALATE 5 MG/1
5 TABLET ORAL DAILY
COMMUNITY
Start: 2025-07-09

## 2025-07-21 RX ORDER — CHOLECALCIFEROL (VITAMIN D3) 25 MCG
25 TABLET ORAL DAILY
COMMUNITY

## 2025-07-21 SDOH — ECONOMIC STABILITY: FOOD INSECURITY: WITHIN THE PAST 12 MONTHS, YOU WORRIED THAT YOUR FOOD WOULD RUN OUT BEFORE YOU GOT MONEY TO BUY MORE.: NEVER TRUE

## 2025-07-21 SDOH — ECONOMIC STABILITY: INCOME INSECURITY: IN THE LAST 12 MONTHS, WAS THERE A TIME WHEN YOU WERE NOT ABLE TO PAY THE MORTGAGE OR RENT ON TIME?: NO

## 2025-07-21 SDOH — ECONOMIC STABILITY: FOOD INSECURITY: WITHIN THE PAST 12 MONTHS, THE FOOD YOU BOUGHT JUST DIDN'T LAST AND YOU DIDN'T HAVE MONEY TO GET MORE.: NEVER TRUE

## 2025-07-21 SDOH — ECONOMIC STABILITY: TRANSPORTATION INSECURITY
IN THE PAST 12 MONTHS, HAS THE LACK OF TRANSPORTATION KEPT YOU FROM MEDICAL APPOINTMENTS OR FROM GETTING MEDICATIONS?: NO

## 2025-07-21 SDOH — ECONOMIC STABILITY: TRANSPORTATION INSECURITY
IN THE PAST 12 MONTHS, HAS LACK OF TRANSPORTATION KEPT YOU FROM MEETINGS, WORK, OR FROM GETTING THINGS NEEDED FOR DAILY LIVING?: NO

## 2025-07-21 ASSESSMENT — SOCIAL DETERMINANTS OF HEALTH (SDOH): IN THE PAST 12 MONTHS, HAS THE ELECTRIC, GAS, OIL, OR WATER COMPANY THREATENED TO SHUT OFF SERVICE IN YOUR HOME?: NO

## 2025-07-21 ASSESSMENT — PAIN SCALES - GENERAL: PAINLEVEL_OUTOF10: 0-NO PAIN

## 2025-07-21 ASSESSMENT — PATIENT HEALTH QUESTIONNAIRE - PHQ9: SUM OF ALL RESPONSES TO PHQ9 QUESTIONS 1 & 2: 0

## 2025-07-21 NOTE — PROGRESS NOTES
[facility]    Consent obtained from patient and all parties present in the room? Yes    I have obtained the consent of everyone present in the room to make an audio recording of this visit to assist me and documenting the encounter in the EMR.    Subjective:  Sirena Shin is a 24 y.o. female presenting with the chief complaint of:   Chief Complaint   Patient presents with    Annual Exam       Chronic medical conditions include:  Patient Active Problem List   Diagnosis    Intractable chronic migraine without aura and with status migrainosus    Atypical Kawasaki disease (CMS/HCC)    Family history of elevated blood lipids    Family history of kidney disease in mother    Verrucae vulgaris    Vitamin D deficiency    Elevated LDL cholesterol level    Anxiety    Tension headache       HPI:  History of Present Illness  The patient presents for an annual exam.    She reports a satisfactory year overall, with the exception of a sports injury in March 2025. She sustained a meniscus tear while playing basketball, which was surgically removed within 10 days. Her recovery has been slower than anticipated, but she is making progress through physical therapy sessions twice a week. She has 3 weeks of physical therapy left. She expresses frustration at her inability to resume her weekly basketball games. She maintains an active lifestyle, working out several times a week and incorporating cardio exercises.    She began taking Lexapro 10 days ago to manage new anxiety and OCD symptoms that have emerged over the past 8 months. Since moving into her own place in October 2024, she has noticed an increase in anxiety, a condition she was previously unfamiliar with. Despite working with a therapist, her anxiety persisted, leading to the initiation of Lexapro treatment. She reports feeling slightly better with no side effects, but has not observed significant improvement. Her pre-sleep anxiety has  improved. She experiences obsessive thoughts before bed, which cause stress, and hopes the medication will alleviate this. Her therapist recommended a psychiatrist, whom she consulted 2 weeks ago. She believes her anxiety was triggered by living alone, as she had not been accustomed to being alone with her thoughts for some time. She is currently on a 5 mg dose of Lexapro.    She experiences tension headaches, which can sometimes last for several days and cause a feeling of fogginess. The severity of her headaches varies, making them difficult to track. They were more severe in the past 3 years, but have improved over the past year. She attributes this improvement to moving out of an old house and into a new apartment, as well as getting a new bed and pillow. She manages her headaches with Tylenol or ibuprofen and does not take Imitrex. She finds that her headaches prompt her to consider her hydration and sleep patterns, which she believes are contributing factors.    She has been dealing with sinus issues but has not had any major sinus infections this year. She has reduced her alcohol consumption and socializing, which she believes has improved her overall health.    She has not yet seen a cardiologist for her 5-year check-up.    She takes vitamin D supplements (1000 units) and reports no symptoms of fatigue, cold intolerance, or ice cravings.    Occupations: Works in construction  Hobbies: Basketball  Diet: High protein diet, good amount of vegetables and fruits, occasionally follows a calorie deficit diet  Alcohol: Reduced consumption  Sleep: Reports pre-sleep anxiety, improved with Lexapro  Sexual Practices: Monogamous relationship with a girlfriend  Living Condition: Lives alone since October 2024    GYNECOLOGICAL HISTORY:  - Frequency and Flow: Regular, not excessively heavy    PAST SURGICAL HISTORY:  - Right meniscus tear, meniscectomy in March 2025  - ACL surgery (date not specified)    FAMILY HISTORY  Her  "mother and father have high cholesterol. No family history of heart disease or skin cancer.    Hx Kawasaki as a child. Reports regular cardio f/u for years and now supposed to see cardiology q 5 years.  Due for cardiology f/u    The following portions of the patient's history were reviewed and updated as appropriate: allergies, current medications, family history, past medical history, social history, surgical history, and problem list.     Current Outpatient Medications   Medication Instructions    cholecalciferol (vitamin D3) (CHOLECALCIFEROL) 25 mcg, Daily    escitalopram (LEXAPRO) 5 mg, Daily       Review of Systems  As noted in HPI and otherwise negative    Objective:    Vitals:    07/21/25 1456   BP: 112/70   BP Location: Right upper arm   Patient Position: Sitting   Pulse: 74   Resp: 18   Temp: 36.7 °C (98.1 °F)   TempSrc: Temporal   SpO2: 98%   Weight: 65.8 kg (145 lb)   Height: 1.662 m (5' 5.45\")     Wt Readings from Last 3 Encounters:   07/21/25 65.8 kg (145 lb)   09/10/24 65.8 kg (145 lb)   12/19/23 68.5 kg (151 lb)       Body mass index is 23.8 kg/m².    Physical Exam  Vitals reviewed.   Constitutional:       Appearance: Normal appearance.   HENT:      Head: Normocephalic and atraumatic.      Right Ear: Tympanic membrane, ear canal and external ear normal. There is no impacted cerumen.      Left Ear: Tympanic membrane, ear canal and external ear normal. There is no impacted cerumen.      Mouth/Throat:      Pharynx: No oropharyngeal exudate or posterior oropharyngeal erythema.   Eyes:      Conjunctiva/sclera: Conjunctivae normal.      Pupils: Pupils are equal, round, and reactive to light.   Neck:      Thyroid: No thyroid mass, thyromegaly or thyroid tenderness.   Cardiovascular:      Rate and Rhythm: Normal rate and regular rhythm.      Heart sounds: Murmur (1/6 systolic murmur LUSB) heard.   Pulmonary:      Effort: Pulmonary effort is normal. No respiratory distress.      Breath sounds: No wheezing, " rhonchi or rales.   Abdominal:      General: Abdomen is flat. Bowel sounds are normal. There is no distension.      Tenderness: There is no abdominal tenderness. There is no guarding.   Musculoskeletal:      Right lower leg: No edema.      Left lower leg: No edema.   Lymphadenopathy:      Cervical: No cervical adenopathy.   Skin:     General: Skin is warm and dry.   Neurological:      General: No focal deficit present.      Mental Status: She is alert and oriented to person, place, and time.   Psychiatric:         Mood and Affect: Mood normal.         Behavior: Behavior normal.         Thought Content: Thought content normal.         Judgment: Judgment normal.         Assessment & Plan:    Assessment & Plan  1. Annual exam  She is up to date on her health maintenance. She is out of the age range to consider further meningitis vaccines. COVID-19 vaccine will be addressed in the fall. Tetanus vaccine is up to date until 2032. Pap smear is up to date. Fasting blood work will be ordered to check cholesterol, electrolytes, liver, kidney, thyroid, blood counts, blood sugar, average blood sugar, and vitamin D levels. She should fast for 10 to 12 hours but can drink water beforehand.  -Encouraged regular exercise and healthy diet.  -Encouraged regular eye and dental appointments.  -Health maintenance updated/addressed as noted in HPI and below  -Routine vaccinations recommended   - Derm at 28    2. Atypical Kawasaki disease.  She is supposed to see cardiology every 5 years and is due for a visit. Cardiology information provided.    3. Vitamin D deficiency.  She is on a 1000 unit vitamin D3 supplement.    4. Elevated LDL cholesterol.  There is a family history of hyperlipidemia but no family history of heart disease at this time. Continue to monitor. She remains active and eats a healthy diet.    5. Right meniscus tear.  She underwent meniscectomy in March 2025 and is continuing with physical therapy.    6. Anxiety.  She had  started on Lexapro 5 mg daily. She sees a therapist regularly and also has a psychiatrist. It was communicated that assistance with prescribing or dose adjustments is available if needed.    7. Tension headache.  Discussed option for magnesium glycinate to see if that helps preventatively and this should also help with her anxiety as well. If she experiences diarrhea, she should stop taking it.    Follow-up: The patient will follow up in 1 year.    Orders Placed This Encounter   Procedures    Hemoglobin A1c    CBC and Differential    Comprehensive metabolic panel    TSH w reflex FT4    Lipid panel    Vitamin D 25 hydroxy        Return in about 1 year (around 7/21/2026) for Annual Physical Exam.    Health Maintenance   Topic Date Due    COVID-19 Vaccine (3 - 2024-25 season) 09/01/2024    Influenza Vaccine (1) 08/01/2025    Cervical Cancer Screening  07/10/2026    Depression Screening  07/21/2026    DTaP, Tdap, and Td Vaccines (8 - Td or Tdap) 07/20/2032    Zoster Vaccine (1 of 2) 09/05/2048    RSV Vaccine (1 - 1-dose 75+ series) 09/05/2073    Meningococcal ACWY  Completed    HIB Vaccines  Completed    Hepatitis B Vaccines  Completed    IPV Vaccines  Completed    HPV Vaccines  Completed    HIV Screening  Completed    Hepatitis C Screening  Completed    RSV <20 months  Aged Out    Pneumococcal  Aged Out    Meningococcal B  Discontinued       Blanche Hernandez DO  07/21/25

## 2025-07-21 NOTE — PATIENT INSTRUCTIONS
Magnesium glycinate 400-800mg nightly     For cardiology, please contact one of the groups below:    Dr. Dr. Payne at [facility] [phone]    Dr. Joe Elizabeth or Dr. Gokul Gonsalez at [facility] [phone]